# Patient Record
Sex: FEMALE | Race: WHITE | Employment: UNEMPLOYED | URBAN - METROPOLITAN AREA
[De-identification: names, ages, dates, MRNs, and addresses within clinical notes are randomized per-mention and may not be internally consistent; named-entity substitution may affect disease eponyms.]

---

## 2020-10-03 ENCOUNTER — HOSPITAL ENCOUNTER (OUTPATIENT)
Facility: CLINIC | Age: 75
Setting detail: OBSERVATION
Discharge: NURSING, REHAB OR LONG-TERM CARE | End: 2020-10-05
Attending: EMERGENCY MEDICINE | Admitting: EMERGENCY MEDICINE
Payer: COMMERCIAL

## 2020-10-03 ENCOUNTER — APPOINTMENT (OUTPATIENT)
Dept: GENERAL RADIOLOGY | Facility: CLINIC | Age: 75
End: 2020-10-03
Attending: EMERGENCY MEDICINE
Payer: COMMERCIAL
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
## 2020-10-03 DIAGNOSIS — N39.0 URINARY TRACT INFECTION WITHOUT HEMATURIA, SITE UNSPECIFIED: Primary | ICD-10-CM

## 2020-10-03 DIAGNOSIS — B37.2 CANDIDIASIS OF SKIN: ICD-10-CM

## 2020-10-03 DIAGNOSIS — S52.501A CLOSED FRACTURE OF DISTAL END OF RIGHT RADIUS, UNSPECIFIED FRACTURE MORPHOLOGY, INITIAL ENCOUNTER: ICD-10-CM

## 2020-10-03 DIAGNOSIS — E11.9 TYPE 2 DIABETES MELLITUS WITHOUT COMPLICATION, UNSPECIFIED WHETHER LONG TERM INSULIN USE (H): ICD-10-CM

## 2020-10-03 LAB
ALBUMIN SERPL-MCNC: 3.5 G/DL (ref 3.4–5)
ALBUMIN UR-MCNC: NEGATIVE MG/DL
ALP SERPL-CCNC: 143 U/L (ref 40–150)
ALT SERPL W P-5'-P-CCNC: 75 U/L (ref 0–50)
ANION GAP SERPL CALCULATED.3IONS-SCNC: 5 MMOL/L (ref 3–14)
APPEARANCE UR: CLEAR
AST SERPL W P-5'-P-CCNC: 73 U/L (ref 0–45)
BACTERIA #/AREA URNS HPF: ABNORMAL /HPF
BASOPHILS # BLD AUTO: 0 10E9/L (ref 0–0.2)
BASOPHILS NFR BLD AUTO: 0.3 %
BILIRUB SERPL-MCNC: 0.6 MG/DL (ref 0.2–1.3)
BILIRUB UR QL STRIP: NEGATIVE
BUN SERPL-MCNC: 10 MG/DL (ref 7–30)
CALCIUM SERPL-MCNC: 9 MG/DL (ref 8.5–10.1)
CHLORIDE SERPL-SCNC: 105 MMOL/L (ref 94–109)
CO2 SERPL-SCNC: 26 MMOL/L (ref 20–32)
COLOR UR AUTO: ABNORMAL
CREAT SERPL-MCNC: 0.56 MG/DL (ref 0.52–1.04)
DIFFERENTIAL METHOD BLD: ABNORMAL
EOSINOPHIL # BLD AUTO: 0 10E9/L (ref 0–0.7)
EOSINOPHIL NFR BLD AUTO: 0.1 %
ERYTHROCYTE [DISTWIDTH] IN BLOOD BY AUTOMATED COUNT: 12.2 % (ref 10–15)
GFR SERPL CREATININE-BSD FRML MDRD: >90 ML/MIN/{1.73_M2}
GLUCOSE SERPL-MCNC: 435 MG/DL (ref 70–99)
GLUCOSE UR STRIP-MCNC: >1000 MG/DL
HCT VFR BLD AUTO: 41.7 % (ref 35–47)
HGB BLD-MCNC: 14.3 G/DL (ref 11.7–15.7)
HGB UR QL STRIP: ABNORMAL
IMM GRANULOCYTES # BLD: 0 10E9/L (ref 0–0.4)
IMM GRANULOCYTES NFR BLD: 0.3 %
INR PPP: 1.13 (ref 0.86–1.14)
KETONES UR STRIP-MCNC: 10 MG/DL
LEUKOCYTE ESTERASE UR QL STRIP: NEGATIVE
LYMPHOCYTES # BLD AUTO: 2.3 10E9/L (ref 0.8–5.3)
LYMPHOCYTES NFR BLD AUTO: 18.4 %
MCH RBC QN AUTO: 31.8 PG (ref 26.5–33)
MCHC RBC AUTO-ENTMCNC: 34.3 G/DL (ref 31.5–36.5)
MCV RBC AUTO: 93 FL (ref 78–100)
MONOCYTES # BLD AUTO: 0.6 10E9/L (ref 0–1.3)
MONOCYTES NFR BLD AUTO: 4.7 %
MUCOUS THREADS #/AREA URNS LPF: PRESENT /LPF
NEUTROPHILS # BLD AUTO: 9.3 10E9/L (ref 1.6–8.3)
NEUTROPHILS NFR BLD AUTO: 76.2 %
NITRATE UR QL: POSITIVE
NRBC # BLD AUTO: 0 10*3/UL
NRBC BLD AUTO-RTO: 0 /100
PH UR STRIP: 5 PH (ref 5–7)
PLATELET # BLD AUTO: 222 10E9/L (ref 150–450)
POTASSIUM SERPL-SCNC: 3.8 MMOL/L (ref 3.4–5.3)
PROT SERPL-MCNC: 7.5 G/DL (ref 6.8–8.8)
RBC # BLD AUTO: 4.49 10E12/L (ref 3.8–5.2)
RBC #/AREA URNS AUTO: 0 /HPF (ref 0–2)
SARS-COV-2 RNA SPEC QL NAA+PROBE: NORMAL
SODIUM SERPL-SCNC: 136 MMOL/L (ref 133–144)
SOURCE: ABNORMAL
SP GR UR STRIP: 1.02 (ref 1–1.03)
SPECIMEN SOURCE: NORMAL
SQUAMOUS #/AREA URNS AUTO: <1 /HPF (ref 0–1)
UROBILINOGEN UR STRIP-MCNC: NORMAL MG/DL (ref 0–2)
WBC # BLD AUTO: 12.3 10E9/L (ref 4–11)
WBC #/AREA URNS AUTO: 1 /HPF (ref 0–5)

## 2020-10-03 PROCEDURE — 80053 COMPREHEN METABOLIC PANEL: CPT | Performed by: EMERGENCY MEDICINE

## 2020-10-03 PROCEDURE — 93005 ELECTROCARDIOGRAM TRACING: CPT

## 2020-10-03 PROCEDURE — 87088 URINE BACTERIA CULTURE: CPT | Performed by: EMERGENCY MEDICINE

## 2020-10-03 PROCEDURE — U0003 INFECTIOUS AGENT DETECTION BY NUCLEIC ACID (DNA OR RNA); SEVERE ACUTE RESPIRATORY SYNDROME CORONAVIRUS 2 (SARS-COV-2) (CORONAVIRUS DISEASE [COVID-19]), AMPLIFIED PROBE TECHNIQUE, MAKING USE OF HIGH THROUGHPUT TECHNOLOGIES AS DESCRIBED BY CMS-2020-01-R: HCPCS | Performed by: EMERGENCY MEDICINE

## 2020-10-03 PROCEDURE — 250N000013 HC RX MED GY IP 250 OP 250 PS 637: Performed by: STUDENT IN AN ORGANIZED HEALTH CARE EDUCATION/TRAINING PROGRAM

## 2020-10-03 PROCEDURE — 81001 URINALYSIS AUTO W/SCOPE: CPT | Performed by: EMERGENCY MEDICINE

## 2020-10-03 PROCEDURE — 87086 URINE CULTURE/COLONY COUNT: CPT | Performed by: EMERGENCY MEDICINE

## 2020-10-03 PROCEDURE — 96376 TX/PRO/DX INJ SAME DRUG ADON: CPT

## 2020-10-03 PROCEDURE — G0378 HOSPITAL OBSERVATION PER HR: HCPCS

## 2020-10-03 PROCEDURE — 999N000065 XR WRIST RIGHT 2 VIEW: Mod: RT

## 2020-10-03 PROCEDURE — C9803 HOPD COVID-19 SPEC COLLECT: HCPCS

## 2020-10-03 PROCEDURE — 250N000011 HC RX IP 250 OP 636: Performed by: EMERGENCY MEDICINE

## 2020-10-03 PROCEDURE — 85610 PROTHROMBIN TIME: CPT | Performed by: EMERGENCY MEDICINE

## 2020-10-03 PROCEDURE — 25605 CLTX DST RDL FX/EPHYS SEP W/: CPT | Mod: RT

## 2020-10-03 PROCEDURE — 99220 PR INITIAL OBSERVATION CARE,LEVEL III: CPT | Performed by: STUDENT IN AN ORGANIZED HEALTH CARE EDUCATION/TRAINING PROGRAM

## 2020-10-03 PROCEDURE — 99285 EMERGENCY DEPT VISIT HI MDM: CPT | Mod: 25

## 2020-10-03 PROCEDURE — 85025 COMPLETE CBC W/AUTO DIFF WBC: CPT | Performed by: EMERGENCY MEDICINE

## 2020-10-03 PROCEDURE — 96374 THER/PROPH/DIAG INJ IV PUSH: CPT

## 2020-10-03 PROCEDURE — 87186 SC STD MICRODIL/AGAR DIL: CPT | Performed by: EMERGENCY MEDICINE

## 2020-10-03 RX ORDER — CEFTRIAXONE 1 G/1
1 INJECTION, POWDER, FOR SOLUTION INTRAMUSCULAR; INTRAVENOUS ONCE
Status: COMPLETED | OUTPATIENT
Start: 2020-10-03 | End: 2020-10-03

## 2020-10-03 RX ORDER — ONDANSETRON 2 MG/ML
4 INJECTION INTRAMUSCULAR; INTRAVENOUS EVERY 6 HOURS PRN
Status: DISCONTINUED | OUTPATIENT
Start: 2020-10-03 | End: 2020-10-05 | Stop reason: HOSPADM

## 2020-10-03 RX ORDER — NALOXONE HYDROCHLORIDE 0.4 MG/ML
.1-.4 INJECTION, SOLUTION INTRAMUSCULAR; INTRAVENOUS; SUBCUTANEOUS
Status: DISCONTINUED | OUTPATIENT
Start: 2020-10-03 | End: 2020-10-05 | Stop reason: HOSPADM

## 2020-10-03 RX ORDER — OXYCODONE HYDROCHLORIDE 5 MG/1
5 TABLET ORAL EVERY 4 HOURS PRN
Status: DISCONTINUED | OUTPATIENT
Start: 2020-10-03 | End: 2020-10-05 | Stop reason: HOSPADM

## 2020-10-03 RX ORDER — ACETAMINOPHEN 325 MG/1
650 TABLET ORAL EVERY 4 HOURS PRN
Status: DISCONTINUED | OUTPATIENT
Start: 2020-10-03 | End: 2020-10-04

## 2020-10-03 RX ORDER — ONDANSETRON 4 MG/1
4 TABLET, ORALLY DISINTEGRATING ORAL EVERY 6 HOURS PRN
Status: DISCONTINUED | OUTPATIENT
Start: 2020-10-03 | End: 2020-10-05 | Stop reason: HOSPADM

## 2020-10-03 RX ORDER — LIDOCAINE 40 MG/G
CREAM TOPICAL
Status: DISCONTINUED | OUTPATIENT
Start: 2020-10-03 | End: 2020-10-05 | Stop reason: HOSPADM

## 2020-10-03 RX ORDER — CEFTRIAXONE 1 G/1
1 INJECTION, POWDER, FOR SOLUTION INTRAMUSCULAR; INTRAVENOUS EVERY 24 HOURS
Status: DISCONTINUED | OUTPATIENT
Start: 2020-10-04 | End: 2020-10-05 | Stop reason: HOSPADM

## 2020-10-03 RX ADMIN — OXYCODONE HYDROCHLORIDE 5 MG: 5 TABLET ORAL at 20:57

## 2020-10-03 RX ADMIN — CEFTRIAXONE SODIUM 1 G: 1 INJECTION, POWDER, FOR SOLUTION INTRAMUSCULAR; INTRAVENOUS at 18:50

## 2020-10-03 SDOH — HEALTH STABILITY: MENTAL HEALTH: HOW OFTEN DO YOU HAVE A DRINK CONTAINING ALCOHOL?: 2-4 TIMES A MONTH

## 2020-10-03 SDOH — HEALTH STABILITY: MENTAL HEALTH: HOW OFTEN DO YOU HAVE 6 OR MORE DRINKS ON ONE OCCASION?: NEVER

## 2020-10-03 SDOH — HEALTH STABILITY: MENTAL HEALTH: HOW MANY STANDARD DRINKS CONTAINING ALCOHOL DO YOU HAVE ON A TYPICAL DAY?: NOT ASKED

## 2020-10-03 ASSESSMENT — ACTIVITIES OF DAILY LIVING (ADL)
CONCENTRATING,_REMEMBERING_OR_MAKING_DECISIONS_DIFFICULTY: NO
FALL_HISTORY_WITHIN_LAST_SIX_MONTHS: YES
DRESSING/BATHING_DIFFICULTY: YES
TOILETING_ISSUES: YES
DIFFICULTY_EATING/SWALLOWING: NO
NUMBER_OF_TIMES_PATIENT_HAS_FALLEN_WITHIN_LAST_SIX_MONTHS: 2
WALKING_OR_CLIMBING_STAIRS_DIFFICULTY: YES
DIFFICULTY_COMMUNICATING: NO
WEAR_GLASSES_OR_BLIND: YES
WALKING_OR_CLIMBING_STAIRS: AMBULATION DIFFICULTY, REQUIRES EQUIPMENT
DOING_ERRANDS_INDEPENDENTLY_DIFFICULTY: NO
TOILETING_ASSISTANCE: TOILETING DIFFICULTY, ASSISTANCE 1 PERSON
VISION_MANAGEMENT: GLASSES
EQUIPMENT_CURRENTLY_USED_AT_HOME: CANE, QUAD
DRESSING/BATHING: BATHING DIFFICULTY, ASSISTANCE 1 PERSON;DRESSING DIFFICULTY, ASSISTANCE 1 PERSON

## 2020-10-03 ASSESSMENT — MIFFLIN-ST. JEOR: SCORE: 1313.28

## 2020-10-03 ASSESSMENT — ENCOUNTER SYMPTOMS: BACK PAIN: 1

## 2020-10-03 NOTE — PHARMACY-ADMISSION MEDICATION HISTORY
Pharmacy Medication History  Admission medication history interview status for the 10/3/2020  admission is complete. See EPIC admission navigator for prior to admission medications     Medication history sources: Patient  Medication history source reliability: Good  Adherence assessment: Good    Additional medication history information:    Aspirin: sometimes takes 4 tablets per day for pain    Medication reconciliation completed by provider prior to medication history? No    Time spent in this activity: 5 minutes       Prior to Admission medications    Medication Sig Last Dose Taking? Auth Provider   aspirin (ASA) 325 MG EC tablet Take 325 mg by mouth every 6 hours as needed for moderate pain 10/3/2020 at Unknown time Yes Unknown, Entered By History     Evelin Clifford, PharmD, BCCCP

## 2020-10-03 NOTE — ED PROVIDER NOTES
"  History     Chief Complaint:  Wrist Pain       The history is provided by the patient and a relative (brother-in-law).      Isabella Prather is a 75 year old female who presents with her brother in law for right wrist pain after a fall occurring this morning. The patient states that she was packing up her house to move out of town when she fell down eight steps in her house. She notes that she has since had some back pain as well during this time. She notes no hip pain, head injury or loss of consciousness. The patient states that she was seen at [facility] earlier today where wrist x-rays were obtained, noting a distal radius fracture. The patient was recommended to have \"a procedure\" completed at [facility], however, the patient states that she was concerned about this. She states that she would like to be admitted to the hospital \"so that something can be done over the weekend\" as she does not want to stay at home where her house is boxed up. The patient's brother-in-law notes that he is in town from [place] and then the patient is moving to [place], [place], where he feels that she can see orthopedics. He also notes that the patient normally walks with a cane and that he is concerned with her staying home alone and that her sister may not be able to take care of the patient appropriately in her condition. Her brother-in-law states that she has had difficulty taking care of herself at home and that the patient is moving in with her sister in order to handle some of these issues. The patient feels that she would be well enough to stay at her new [facility] home with her sister, however, her sisters do not feel that they will be able to appropriately be able to take care of her given this wrist injury and likely difficulty walking. Of note, the patient notes that she has incontinence issues and has been largely bed bound for the past year, however, states that she is unsure why.     Allergies:  No Known Drug " "Allergies    Medications:    The patient is not currently taking any prescribed medications.    Past Medical History:    History reviewed. No pertinent past medical history.    Past Surgical History:    Orbital fracture surgery    Family History:    History reviewed. No pertinent family history.    Social History:  The patient presents to the ED with her brother-in-law.  Smoking Status: Former Smoker, 20 years  Smokeless Tobacco: Never Used  Alcohol Use: Yes  PCP: No Ref-Primary, Physician     Review of Systems   Musculoskeletal: Positive for back pain.        (+) right wrist pain  (-) hip pain   Neurological:        (-) loss of consciousness    All other systems reviewed and are negative.    Physical Exam     Patient Vitals for the past 24 hrs:   BP Temp Temp src Pulse Resp SpO2 Height Weight   10/03/20 1536 128/79 97.1  F (36.2  C) Oral 71 16 95 % 1.702 m (5' 7\") 77.6 kg (171 lb)       Physical Exam    General: Sitting up in bed  Eyes:  The pupils are equal and round    Conjunctivae and sclerae are normal  ENT:    Wearing a mask, no head trauma  Neck:  Normal range of motion, no tenderness on cervical spine  CV:  Regular rate     Skin warm and well perfused   Resp:  Non labored breathing on room air    No tachypnea    No cough heard  GI:  Abdomen is soft, there is no rigidity    No distension    No rebound tenderness     No abdominal tenderness  MS:  No tenderness on thoracic/lumbar spine. Tender, swelling and ecchymosis on right distal forearm. Non tender right elbow, right shoulder  Skin:  No open areas on right wrist  Neuro:   Awake, alert.      Speech is normal and fluent.    Face is symmetric.     Moves all extremities equally    SILT on right hand  Psych: Normal affect.  Appropriate interactions.    Emergency Department Course     ECG:  Indication: Fall  Time: 1902  Vent. Rate 89 bpm. ID interval 144. QRS duration 62. QT/QTc 362/440. P-R-T axis 12 -16 3.   Sinus rhythm. Cannot rule out anterior infarct, age " undetermined. Abnormal ECG.  Read time: 1910     Imaging:  Radiology findings were communicated with the patient, family and admitting MD who voiced understanding of the findings.    XR Wrist Right G/E 3 Views:  Distal radius fracture again seen after closed reduction. Mild dorsal displacement is similar to the prior exam (6 mm) angulation of the distal component is improved. No new abnormality in the wrist. New splint in place. As per radiology.     Laboratory:  Laboratory findings were communicated with the patient, family and admitting MD who voiced understanding of the findings.    CBC: WBC: 12.3 (high) , HGB: 14.3, PLT: 222    CMP: Glucose 435 (high), ALT 75 (high), AST 73 (high) o/w WNL (Creatinine: 0.56)    UA with Microscopic: Glucose >1000, Ketones 10, Blood Trace, Nitrite Positive, Bacteria Few, Mucous Present o/w Negative    INR: 1.13    Urine Culture: Pending    Asymptomatic COVID-19 Virus (Coronavirus) PCR: Pending      [facility]    -Fracture    Date/Time: 10/3/2020 6:08 PM  Performed by: Jennie Zaldivar MD  Authorized by: Jennie Zaldivar MD       INJURY      Injury location:  Forearm    Forearm fracture type: distal radial      PRE PROCEDURE ASSESSMENT      Neurological function: normal      Range of motion: reduced      ANESTHESIA (see MAR for exact dosages)      Anesthesia method:  Local infiltration    Local anesthetic:  Lidocaine 1% w/o epi    PROCEDURE DETAILS:     Manipulation performed: yes      Skeletal traction used: yes      Reduction successful: yes      X-ray confirmed reduction: yes      Immobilization:  Splint    Splint type:  Sugar tong    POST PROCEDURE ASSESSMENT      Neurological function: normal      PROCEDURE   Patient Tolerance:  Patient tolerated the procedure well with no immediate complications        Splint Placement     Sugar tong splint was applied and after placement I checked and adjusted the fit to ensure proper positioning.  Patient was more comfortable with splint in place. Sensation and circulation are intact after splint placement.    Interventions:  1850 Rocephin 1g IV    Emergency Department Course:  Past medical records, nursing notes, and vitals reviewed.    1621 I performed an exam of the patient as documented above.     IV was inserted and blood was drawn for laboratory testing, results above.    The patient provided a urine sample here in the emergency department. This was sent for laboratory testing, findings above.    The patient was sent for a x-ray while in the emergency department, results above.     1653 I consulted with Dr. Hung, orthopedist, regarding the patient's history and presentation here in the emergency department.     1753 I rechecked the patient and discussed the results of her workup thus far. I performed the reduction and splint placement at this time, as noted above. At this point plan will be for admission, and the patient consents.     1820 I rechecked the patient who is feeling improved.     1852 I consulted with Dr. Cordon, hospitalist, regarding the patient's history and presentation here in the emergency department who accepted the patient for admission.    Findings and plan explained to the patient who consents to admission. Discussed the patient with Dr. Cordon, who will admit the patient to an observation bed for further monitoring, evaluation, and treatment.    I personally reviewed the imaging results with the patient and answered all related questions prior to admission.     Impression & Plan     Covid-19  Isabella Prather was evaluated during a global COVID-19 pandemic, which necessitated consideration that the patient might be at risk for infection with the SARS-CoV-2 virus that causes COVID-19.   Applicable protocols for evaluation were followed during the patient's care. COVID-19 was considered as part of the patient's evaluation. The plan for testing is: a test was obtained during this  visit.     Medical Decision Making:  Isabella Prather is a 75 year old female who presented to the ED with wrist fracture. Patient with fracture. Images able to be viewed in PACS. Reduction and splinting done. Main concern is inability to care for her self as she is currently moving and does not have a safe place to stay. Will require SW and PT evaluation. Brother and patient agreeable with hospital stay. No other injuries from fall. Sounds like mechanical fall. UA mildly abnormal, given ceftriaxone as she is having urinary symptoms.    Diagnosis:    ICD-10-CM    1. Closed fracture of distal end of right radius, unspecified fracture morphology, initial encounter  S52.501A CBC with platelets differential     UA with Microscopic     Comprehensive metabolic panel     INR     Urine Culture     Asymptomatic COVID-19 Virus (Coronavirus) by PCR     SARS-CoV-2 COVID-19 Virus (Coronavirus) RT-PCR     SARS-CoV-2 COVID-19 Virus (Coronavirus) RT-PCR   2. Fall, initial encounter  W19.XXXA        Disposition:  Admitted to Dr. Cordon.    Scribe Disclosure:  I, Isaiah Cortez, am serving as a scribe at 4:09 PM on 10/3/2020 to document services personally performed by Jennie Zaldivar MD based on my observations and the provider's statements to me.      Jennie Zaldivar MD  10/03/20 8537

## 2020-10-03 NOTE — ED NOTES
"[facility]  ED Nurse Handoff Report    ED Chief complaint: Wrist Pain      ED Diagnosis:   Final diagnoses:   Closed fracture of distal end of right radius, unspecified fracture morphology, initial encounter   Fall, initial encounter       Code Status:Per hospitalist.     Allergies: No Known Allergies    Patient Story: Pt fell down stairs this am, denies hitting head.  No LOC, does not have neck pain. [facility] sent pt over d/t a wrist fracture;  Pt would like to get admitted to the hospital because she is moving and does not have a place to stay tonight.  Pt states, [facility] was not able to do a direct admission.  Pt in splint from [facility].  Focused Assessment:  R wrist splint. Denies pain.     Treatments and/or interventions provided: IV abx. Reduction to R wrist/splint.   Patient's response to treatments and/or interventions: Tolerated well    To be done/followed up on inpatient unit:  Asymptomatic COVID swab    Does this patient have any cognitive concerns?: NA    Activity level - Baseline/Home:  Cane  Activity Level - Current:   Stand with Assist and Cane    Patient's Preferred language: English   Needed?: No    Isolation: None  Infection: Not Applicable  Bariatric?: No    Vital Signs:   Vitals:    10/03/20 1536   BP: 128/79   Pulse: 71   Resp: 16   Temp: 97.1  F (36.2  C)   TempSrc: Oral   SpO2: 95%   Weight: 77.6 kg (171 lb)   Height: 1.702 m (5' 7\")       Cardiac Rhythm:     Was the PSS-3 completed:   Yes  What interventions are required if any?               Family Comments: Sister, Brother in law  OBS brochure/video discussed/provided to patient/family: Yes              Name of person given brochure if not patient: No              Relationship to patient: No    For the majority of the shift this patient's behavior was Green.   Behavioral interventions performed were NA.    ED NURSE PHONE NUMBER: [phone]    Randi Ba RN, BSN  Emergency Department  [facility] - " [facility]

## 2020-10-03 NOTE — ED TRIAGE NOTES
Pt fell down stairs this am, denies hitting head.  No LOC, does not have neck pain. [facility] sent pt over d/t a wrist fracture;  Pt would like to get admitted to the hospital because she is moving and does not have a place to stay tonight.  Pt states, [facility] was not able to do a direct admission.  Pt in splint from [facility].

## 2020-10-04 ENCOUNTER — APPOINTMENT (OUTPATIENT)
Dept: OCCUPATIONAL THERAPY | Facility: CLINIC | Age: 75
End: 2020-10-04
Attending: STUDENT IN AN ORGANIZED HEALTH CARE EDUCATION/TRAINING PROGRAM
Payer: COMMERCIAL

## 2020-10-04 LAB
ANION GAP SERPL CALCULATED.3IONS-SCNC: 6 MMOL/L (ref 3–14)
BACTERIA SPEC CULT: ABNORMAL
BUN SERPL-MCNC: 10 MG/DL (ref 7–30)
CALCIUM SERPL-MCNC: 8.4 MG/DL (ref 8.5–10.1)
CHLORIDE SERPL-SCNC: 106 MMOL/L (ref 94–109)
CO2 SERPL-SCNC: 25 MMOL/L (ref 20–32)
CREAT SERPL-MCNC: 0.52 MG/DL (ref 0.52–1.04)
ERYTHROCYTE [DISTWIDTH] IN BLOOD BY AUTOMATED COUNT: 12.3 % (ref 10–15)
GFR SERPL CREATININE-BSD FRML MDRD: >90 ML/MIN/{1.73_M2}
GLUCOSE BLDC GLUCOMTR-MCNC: 293 MG/DL (ref 70–99)
GLUCOSE BLDC GLUCOMTR-MCNC: 309 MG/DL (ref 70–99)
GLUCOSE BLDC GLUCOMTR-MCNC: 312 MG/DL (ref 70–99)
GLUCOSE SERPL-MCNC: 291 MG/DL (ref 70–99)
HBA1C MFR BLD: 13.1 % (ref 0–5.6)
HCT VFR BLD AUTO: 35.9 % (ref 35–47)
HGB BLD-MCNC: 12.5 G/DL (ref 11.7–15.7)
INTERPRETATION ECG - MUSE: NORMAL
LABORATORY COMMENT REPORT: NORMAL
Lab: ABNORMAL
MCH RBC QN AUTO: 32.4 PG (ref 26.5–33)
MCHC RBC AUTO-ENTMCNC: 34.8 G/DL (ref 31.5–36.5)
MCV RBC AUTO: 93 FL (ref 78–100)
PLATELET # BLD AUTO: 191 10E9/L (ref 150–450)
POTASSIUM SERPL-SCNC: 3.7 MMOL/L (ref 3.4–5.3)
RBC # BLD AUTO: 3.86 10E12/L (ref 3.8–5.2)
SARS-COV-2 RNA SPEC QL NAA+PROBE: NEGATIVE
SODIUM SERPL-SCNC: 137 MMOL/L (ref 133–144)
SPECIMEN SOURCE: ABNORMAL
SPECIMEN SOURCE: NORMAL
WBC # BLD AUTO: 8.7 10E9/L (ref 4–11)

## 2020-10-04 PROCEDURE — 250N000013 HC RX MED GY IP 250 OP 250 PS 637: Performed by: PHYSICIAN ASSISTANT

## 2020-10-04 PROCEDURE — 80048 BASIC METABOLIC PNL TOTAL CA: CPT | Performed by: STUDENT IN AN ORGANIZED HEALTH CARE EDUCATION/TRAINING PROGRAM

## 2020-10-04 PROCEDURE — G0378 HOSPITAL OBSERVATION PER HR: HCPCS

## 2020-10-04 PROCEDURE — 250N000011 HC RX IP 250 OP 636: Performed by: STUDENT IN AN ORGANIZED HEALTH CARE EDUCATION/TRAINING PROGRAM

## 2020-10-04 PROCEDURE — 96376 TX/PRO/DX INJ SAME DRUG ADON: CPT

## 2020-10-04 PROCEDURE — 83036 HEMOGLOBIN GLYCOSYLATED A1C: CPT | Performed by: STUDENT IN AN ORGANIZED HEALTH CARE EDUCATION/TRAINING PROGRAM

## 2020-10-04 PROCEDURE — 250N000012 HC RX MED GY IP 250 OP 636 PS 637: Performed by: PHYSICIAN ASSISTANT

## 2020-10-04 PROCEDURE — 99226 PR SUBSEQUENT OBSERVATION CARE,LEVEL III: CPT | Performed by: PHYSICIAN ASSISTANT

## 2020-10-04 PROCEDURE — 85027 COMPLETE CBC AUTOMATED: CPT | Performed by: STUDENT IN AN ORGANIZED HEALTH CARE EDUCATION/TRAINING PROGRAM

## 2020-10-04 PROCEDURE — 97167 OT EVAL HIGH COMPLEX 60 MIN: CPT | Mod: GO

## 2020-10-04 PROCEDURE — 36415 COLL VENOUS BLD VENIPUNCTURE: CPT | Performed by: STUDENT IN AN ORGANIZED HEALTH CARE EDUCATION/TRAINING PROGRAM

## 2020-10-04 PROCEDURE — 97535 SELF CARE MNGMENT TRAINING: CPT | Mod: GO

## 2020-10-04 PROCEDURE — 999N001017 HC STATISTIC GLUCOSE BY METER IP

## 2020-10-04 PROCEDURE — 83036 HEMOGLOBIN GLYCOSYLATED A1C: CPT | Performed by: PHYSICIAN ASSISTANT

## 2020-10-04 PROCEDURE — 258N000003 HC RX IP 258 OP 636: Performed by: PHYSICIAN ASSISTANT

## 2020-10-04 PROCEDURE — 250N000013 HC RX MED GY IP 250 OP 250 PS 637: Performed by: STUDENT IN AN ORGANIZED HEALTH CARE EDUCATION/TRAINING PROGRAM

## 2020-10-04 PROCEDURE — 96372 THER/PROPH/DIAG INJ SC/IM: CPT | Performed by: PHYSICIAN ASSISTANT

## 2020-10-04 RX ORDER — NICOTINE POLACRILEX 4 MG
15-30 LOZENGE BUCCAL
Status: DISCONTINUED | OUTPATIENT
Start: 2020-10-04 | End: 2020-10-05 | Stop reason: HOSPADM

## 2020-10-04 RX ORDER — ACETAMINOPHEN 325 MG/1
975 TABLET ORAL 3 TIMES DAILY
Status: DISCONTINUED | OUTPATIENT
Start: 2020-10-04 | End: 2020-10-05 | Stop reason: HOSPADM

## 2020-10-04 RX ORDER — SODIUM CHLORIDE 9 MG/ML
INJECTION, SOLUTION INTRAVENOUS CONTINUOUS
Status: DISCONTINUED | OUTPATIENT
Start: 2020-10-04 | End: 2020-10-04

## 2020-10-04 RX ORDER — DEXTROSE MONOHYDRATE 25 G/50ML
25-50 INJECTION, SOLUTION INTRAVENOUS
Status: DISCONTINUED | OUTPATIENT
Start: 2020-10-04 | End: 2020-10-05 | Stop reason: HOSPADM

## 2020-10-04 RX ADMIN — OXYCODONE HYDROCHLORIDE 5 MG: 5 TABLET ORAL at 21:18

## 2020-10-04 RX ADMIN — INSULIN ASPART 4 UNITS: 100 INJECTION, SOLUTION INTRAVENOUS; SUBCUTANEOUS at 18:47

## 2020-10-04 RX ADMIN — MICONAZOLE NITRATE: 20 POWDER TOPICAL at 05:45

## 2020-10-04 RX ADMIN — SODIUM CHLORIDE: 9 INJECTION, SOLUTION INTRAVENOUS at 09:23

## 2020-10-04 RX ADMIN — METFORMIN HYDROCHLORIDE 500 MG: 500 TABLET, FILM COATED ORAL at 18:53

## 2020-10-04 RX ADMIN — CEFTRIAXONE SODIUM 1 G: 1 INJECTION, POWDER, FOR SOLUTION INTRAMUSCULAR; INTRAVENOUS at 18:53

## 2020-10-04 RX ADMIN — ACETAMINOPHEN 975 MG: 325 TABLET, FILM COATED ORAL at 10:44

## 2020-10-04 RX ADMIN — INSULIN ASPART 4 UNITS: 100 INJECTION, SOLUTION INTRAVENOUS; SUBCUTANEOUS at 12:53

## 2020-10-04 NOTE — CONSULTS
[facility]    Orthopedics Consultation    Date of Admission:  10/3/2020    Assessment & Plan   Isabella Prather is a 75 year old female who was admitted on 10/3/2020. I was asked to see the patient for   Right wrist pain.  She has a displaced right distal radius and minimally displaced ulna fractures.    -No surgical intervention at this time.  Based on her age and functional status this may never require surgery and will simply need to be switched out to a short arm cast in approximately 2 to 3 weeks.  -Follow-up with one of my hand or upper extremity partners, Dr. Jos Pires is currently on hand call  -Continue splint on right upper extremity  -Okay to discharge from an orthopedic standpoint once she has been evaluated by PT and can safely return home    Ulises Hung MD    Code Status    Full Code    Reason for Consult   Reason for consult: Right wrist pain    Primary Care Physician   Physician No Ref-Primary    History of Present Illness   Isabella Prather is a 75 year old female who presents with right wrist pain.  She was packing her house and fell down approximately 8 stairs.  She was initially seen at [facility] where a reduction maneuver was performed and she was placed in a sugar tong splint.  She came to [facility] in hopes of having surgery done this weekend.  She denies any numbness or tingling in her right upper extremity.  She is quite combative during my questioning of the patient.  It is very difficult to obtain information from this patient.    MEDS:   No current outpatient medications on file.       PAST MEDICAL HISTORY: History reviewed. No pertinent past medical history.    PAST SURGICAL HISTORY: History reviewed. No pertinent surgical history.    FAMILY HISTORY:   Family History   Problem Relation Age of Onset     No Known Problems Mother      No Known Problems Father        SOCIAL HISTORY:   Social History     Tobacco Use     Smoking status:  Former Smoker     Packs/day: 1.00     Years: 20.00     Pack years: 20.00     Smokeless tobacco: Never Used   Substance Use Topics     Alcohol use: Yes     Frequency: 2-4 times a month     Binge frequency: Never       ALLERGIES:  No Known Allergies    ROS:  10 point ROS neg other than the symptoms noted above in the HPI.      Physical Exam   Temp: 98.6  F (37  C) Temp src: Oral BP: 126/71 Pulse: 94   Resp: 16 SpO2: 93 % O2 Device: None (Room air)    Vital Signs with Ranges  Temp:  [97.1  F (36.2  C)-98.6  F (37  C)] 98.6  F (37  C)  Pulse:  [71-94] 94  Resp:  [16] 16  BP: (110-128)/(58-79) 126/71  SpO2:  [93 %-96 %] 93 %  171 lbs 0 oz    Constitutional: Pleasant, alert, appropriate, following commands.  HEENT: Head atraumatic normocephalic. Pupils equal round and reactive to light.  Respiratory: Unlabored breathing no audible wheeze  Cardiovascular: Regular rate and rhythm  GI: Abdomen soft nontender nondistended.  Lymph/Hematologic: No lymphadenopathy in areas examined  Genitourinary No barillas  Skin: No rashes, no cyanosis, no edema.  Musculoskeletal: Focused examination of the right upper extremity demonstrates a sugar tong splint in place.  She has normal sensation in radial median and ulnar nerve distributions.  She is able to flex and extend at the thumb IP as well as the index finger.  She is able to abduct and abduct the fingers.  She has good capillary refill.  She has no tenderness more proximally in her upper arm.  Neurologic: normal without focal findings, mental status, speech normal, alert and oriented x iii, RENATO, reflexes normal and symmetric  Neuropsychiatric: Very combative patient with labile emotions      Data   Results for orders placed or performed during the hospital encounter of 10/03/20 (from the past 24 hour(s))   -Fracture    Narrative    Jennie Zaldivar MD     10/3/2020 11:29 PM  [facility]    -Fracture    Date/Time: 10/3/2020 6:08 PM  Performed by: Kayley  Jennie Saldivar MD  Authorized by: Jennie Zaldivar MD       INJURY      Injury location:  Forearm    Forearm fracture type: distal radial      PRE PROCEDURE ASSESSMENT      Neurological function: normal      Range of motion: reduced      ANESTHESIA (see MAR for exact dosages)      Anesthesia method:  Local infiltration    Local anesthetic:  Lidocaine 1% w/o epi    PROCEDURE DETAILS:     Manipulation performed: yes      Skeletal traction used: yes      Reduction successful: yes      X-ray confirmed reduction: yes      Immobilization:  Splint    Splint type:  Sugar tong    POST PROCEDURE ASSESSMENT      Neurological function: normal      PROCEDURE   Patient Tolerance:  Patient tolerated the procedure well with no immediate   complications     CBC with platelets differential   Result Value Ref Range    WBC 12.3 (H) 4.0 - 11.0 10e9/L    RBC Count 4.49 3.8 - 5.2 10e12/L    Hemoglobin 14.3 11.7 - 15.7 g/dL    Hematocrit 41.7 35.0 - 47.0 %    MCV 93 78 - 100 fl    MCH 31.8 26.5 - 33.0 pg    MCHC 34.3 31.5 - 36.5 g/dL    RDW 12.2 10.0 - 15.0 %    Platelet Count 222 150 - 450 10e9/L    Diff Method Automated Method     % Neutrophils 76.2 %    % Lymphocytes 18.4 %    % Monocytes 4.7 %    % Eosinophils 0.1 %    % Basophils 0.3 %    % Immature Granulocytes 0.3 %    Nucleated RBCs 0 0 /100    Absolute Neutrophil 9.3 (H) 1.6 - 8.3 10e9/L    Absolute Lymphocytes 2.3 0.8 - 5.3 10e9/L    Absolute Monocytes 0.6 0.0 - 1.3 10e9/L    Absolute Eosinophils 0.0 0.0 - 0.7 10e9/L    Absolute Basophils 0.0 0.0 - 0.2 10e9/L    Abs Immature Granulocytes 0.0 0 - 0.4 10e9/L    Absolute Nucleated RBC 0.0    UA with Microscopic   Result Value Ref Range    Color Urine Light Yellow     Appearance Urine Clear     Glucose Urine >1000 (A) NEG^Negative mg/dL    Bilirubin Urine Negative NEG^Negative    Ketones Urine 10 (A) NEG^Negative mg/dL    Specific Gravity Urine 1.020 1.003 - 1.035    Blood Urine Trace (A) NEG^Negative    pH Urine 5.0 5.0 -  7.0 pH    Protein Albumin Urine Negative NEG^Negative mg/dL    Urobilinogen mg/dL Normal 0.0 - 2.0 mg/dL    Nitrite Urine Positive (A) NEG^Negative    Leukocyte Esterase Urine Negative NEG^Negative    Source Midstream Urine     WBC Urine 1 0 - 5 /HPF    RBC Urine 0 0 - 2 /HPF    Bacteria Urine Few (A) NEG^Negative /HPF    Squamous Epithelial /HPF Urine <1 0 - 1 /HPF    Mucous Urine Present (A) NEG^Negative /LPF   Comprehensive metabolic panel   Result Value Ref Range    Sodium 136 133 - 144 mmol/L    Potassium 3.8 3.4 - 5.3 mmol/L    Chloride 105 94 - 109 mmol/L    Carbon Dioxide 26 20 - 32 mmol/L    Anion Gap 5 3 - 14 mmol/L    Glucose 435 (H) 70 - 99 mg/dL    Urea Nitrogen 10 7 - 30 mg/dL    Creatinine 0.56 0.52 - 1.04 mg/dL    GFR Estimate >90 >60 mL/min/[1.73_m2]    GFR Estimate If Black >90 >60 mL/min/[1.73_m2]    Calcium 9.0 8.5 - 10.1 mg/dL    Bilirubin Total 0.6 0.2 - 1.3 mg/dL    Albumin 3.5 3.4 - 5.0 g/dL    Protein Total 7.5 6.8 - 8.8 g/dL    Alkaline Phosphatase 143 40 - 150 U/L    ALT 75 (H) 0 - 50 U/L    AST 73 (H) 0 - 45 U/L   INR   Result Value Ref Range    INR 1.13 0.86 - 1.14   Urine Culture    Specimen: Midstream Urine   Result Value Ref Range    Specimen Description Midstream Urine     Special Requests Specimen received in preservative     Culture Micro PENDING    XR Wrist Right 2 Views    Narrative    EXAM: XR WRIST RIGHT 2 VIEW  LOCATION: [facility]  DATE/TIME: 10/3/2020 6:36 PM    INDICATION: Postreduction evaluation.  COMPARISON: Wrist radiographs 10/3/2020 at 1343 hours.      Impression    IMPRESSION:   Distal radius fracture again seen after closed reduction. Mild dorsal displacement is similar to the prior exam (6 mm) angulation of the distal component is improved. No new abnormality in the wrist. New splint in place.   Asymptomatic COVID-19 Virus (Coronavirus) by PCR    Specimen: Nasopharyngeal   Result Value Ref Range    COVID-19 Virus PCR to [facility] - Source Nasopharyngeal      COVID-19 Virus PCR to [facility] - Result       Test received-See reflex to IDDL test SARS CoV2 (COVID-19) Virus RT-PCR   EKG 12-lead, tracing only   Result Value Ref Range    Interpretation ECG Click View Image link to view waveform and result    Basic metabolic panel   Result Value Ref Range    Sodium 137 133 - 144 mmol/L    Potassium 3.7 3.4 - 5.3 mmol/L    Chloride 106 94 - 109 mmol/L    Carbon Dioxide 25 20 - 32 mmol/L    Anion Gap 6 3 - 14 mmol/L    Glucose 291 (H) 70 - 99 mg/dL    Urea Nitrogen 10 7 - 30 mg/dL    Creatinine 0.52 0.52 - 1.04 mg/dL    GFR Estimate >90 >60 mL/min/[1.73_m2]    GFR Estimate If Black >90 >60 mL/min/[1.73_m2]    Calcium 8.4 (L) 8.5 - 10.1 mg/dL   CBC with platelets   Result Value Ref Range    WBC 8.7 4.0 - 11.0 10e9/L    RBC Count 3.86 3.8 - 5.2 10e12/L    Hemoglobin 12.5 11.7 - 15.7 g/dL    Hematocrit 35.9 35.0 - 47.0 %    MCV 93 78 - 100 fl    MCH 32.4 26.5 - 33.0 pg    MCHC 34.8 31.5 - 36.5 g/dL    RDW 12.3 10.0 - 15.0 %    Platelet Count 191 150 - 450 10e9/L   Hemoglobin A1c   Result Value Ref Range    Hemoglobin A1C 13.1 (H) 0 - 5.6 %

## 2020-10-04 NOTE — PROGRESS NOTES
[facility]    Medicine Progress Note - Hospitalist Service       Date of Admission:  10/3/2020  Assessment & Plan       Isabella Prather is a 75 year old female admitted on 10/3/2020. She presents with wrist pain following a fall.     Distal right radius fracture  Status-post mechanical fall  Initially presented to [facility] orthopedics for wrist pain following a fall. Treatment there remains unclear; pt ultimately presented to [facility] due to concerns of home safety with injury. Is s/p reduction and splinting in ED. Plain film with post-reduction films indicate mild dorsal displacement, similar to prior exam, splint in place.  - Pain control with scheduled tylenol, PRN oxycodone  - PT/OT/SW, anticipate pt would benefit from TCU  - Orthopedic consult, nonop management, follow-up with hand/upper extremity specialist in 1-2 weeks  - Splint, elevate    UTI, E.coli  Mild dysuria and UA with some bacteruria. Non septic appearing. No fever. Normal WBC. UCx positive for >100k E.coli, susceptibilities pending.  - Continue IV Rocephin while await sensitivity    Coccyx wound, POA  Unclear duration, patient indicates she sustained wound upon falling; sister concerned has been there longer. Conflicting reports of patient's mobility within the past year - some notes indicate patient stating she has largely been bedbound, others indicate she is ambulatory with cane. Patient currently stating she is ambulatory and does not have safety concerns at baseline. On exam, mepilex in place, ecchymosis present to superior gluteal folds R > L.  - WOC consult to evaluate    Diabetes Mellitus Type II, new diagnosis  Admission BG value of 435, UA with >1000 glucose present. Hgb A1c 13.1%, confirming diagnosis of diabetes. Had extensive discussion with patient and sister present at bedside indication to start treatment with insulin.   - Start Lantus 10u daily, will likely need to uptitrate for improved control  -  Metformin 500mg daily started  - Medium ssi  - Accuchecks TID AC & at bedtime  - Mod CHO diet  - Will need to establish care with a PCP for further management, monitoring; discussed with RN Care coordinator and patient  - Pharm liason consult for insulin coverage as pt is concerned re:cost  - May benefit from DM educator referral at discharge       Diet: Moderate Consistent CHO Diet    DVT Prophylaxis: Low Risk/Ambulatory with no VTE prophylaxis indicated  Reyez Catheter: not present  Code Status: Full Code           Disposition Plan   Expected discharge: Tomorrow, recommended to transitional care unit once safe disposition plan/ TCU bed available.  Entered: Colt Florentino PA-C 10/04/2020, 10:58 AM       The patient's care was discussed with the Attending Physician, Dr. Melendez, Bedside Nurse, Care Coordinator/, Patient and Patient's Family. I personally spent 40 minutes directly at patient bedside discussing plan of care, providing education, and in coordination of care.    Colt Florentino PA-C  Hospitalist Service  [facility]  Contact information available via [facility] Paging/Directory    ______________________________________________________________________    Interval History   Pt seen & evaluated. Numerous questions regarding management of fracture, further treatment, pain management, discharge planning, and new medications. Frustrated has not received pain medications since last night, indicates wrist and hand are highly painful. 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 Sister at bedside, both inquiring regarding TCU and insurance, etc. For discharge planning. Would like to speak to .     Discussed new diagnosis of diabetes and indication for insulin, pt agreeable at this time. Hopeful to not require insulin for long-term, understands will likely need at discharge.    Data reviewed today: I reviewed all medications, new labs  and imaging results over the last 24 hours. I personally reviewed no images or EKG's today.    Physical Exam   Vital Signs: Temp: 98.6  F (37  C) Temp src: Oral BP: 126/71 Pulse: 94   Resp: 16 SpO2: 93 % O2 Device: None (Room air)    Weight: 171 lbs 0 oz  GEN: well-developed, well-nourished, appears comfortable  HEENT: NCAT, EOM intact bilaterally, sclera clear, conjunctiva normal, nose & mouth patent, mucous membranes moist  CHEST: lungs CTA bilaterally, no increased work of breathing, no wheeze, crackles, rhonchi  HEART: RRR, S1 & S2, +systolic murmur 2/6 heard left anterolateral chest  ABD: soft, nontender, nondistended, no guarding or rigidity, +BS in all 4 quadrants  MSK: RUE in short arm splint, wiggles digits, sensation intact to light touch overlying lateral edge of 5th digit, dorsal/volar aspects of digits 2-4, and dorsal webspace of thumb; AROM bilateral UE/LE, pedal & radial pulses 2+ bilaterally  NEURO: awake, alert. Slightly tangential in conversation, difficulty staying on task. CN II-XII grossly intact. Sensation grossly intact to light touch.   SKIN: warm & dry without rash, no pedal edema    Data   Recent Labs   Lab 10/04/20  0704 10/03/20  1722   WBC 8.7 12.3*   HGB 12.5 14.3   MCV 93 93    222   INR  --  1.13    136   POTASSIUM 3.7 3.8   CHLORIDE 106 105   CO2 25 26   BUN 10 10   CR 0.52 0.56   ANIONGAP 6 5   MENDEZ 8.4* 9.0   * 435*   ALBUMIN  --  3.5   PROTTOTAL  --  7.5   BILITOTAL  --  0.6   ALKPHOS  --  143   ALT  --  75*   AST  --  73*     Recent Results (from the past 24 hour(s))   XR Wrist Right 2 Views    Narrative    EXAM: XR WRIST RIGHT 2 VIEW  LOCATION: [facility]  DATE/TIME: 10/3/2020 6:36 PM    INDICATION: Postreduction evaluation.  COMPARISON: Wrist radiographs 10/3/2020 at 1343 hours.      Impression    IMPRESSION:   Distal radius fracture again seen after closed reduction. Mild dorsal displacement is similar to the prior exam (6 mm) angulation of the  distal component is improved. No new abnormality in the wrist. New splint in place.     Medications     sodium chloride 100 mL/hr at 10/04/20 0923       acetaminophen  975 mg Oral TID     cefTRIAXone  1 g Intravenous Q24H     insulin aspart  1-7 Units Subcutaneous TID AC     insulin aspart  1-5 Units Subcutaneous At Bedtime     insulin glargine  10 Units Subcutaneous QAM AC     metFORMIN  500 mg Oral Daily with supper     sodium chloride (PF)  3 mL Intracatheter Q8H

## 2020-10-04 NOTE — PROGRESS NOTES
RECEIVING UNIT ED HANDOFF REVIEW    ED Nurse Handoff Report was reviewed by: Shandra Haney RN on October 3, 2020 at 7:52 PM

## 2020-10-04 NOTE — PROGRESS NOTES
[facility]      OUTPATIENT OCCUPATIONAL THERAPY  EVALUATION  PLAN OF TREATMENT FOR OUTPATIENT REHABILITATION  (COMPLETE FOR INITIAL CLAIMS ONLY)  Patient's Last Name, First Name, M.I.  YOB: 1945  Isabella Prather                          Provider's Name  [facility] Medical Record No.  [id number]                               Onset Date:  10/03/20   Start of Care Date:  10/04/20     Type:     ___PT   _X_OT   ___SLP Medical Diagnosis:  Distal radius and ulna fractures                         OT Diagnosis:  Decreased safety with performance of ADL/IADL    Visits from SOC:  1   _________________________________________________________________________________  Plan of Treatment/Functional Goals    Planned Interventions: ADL retraining, balance training, bed mobility training, cognition, strengthening, transfer training, progressive activity/exercise   Goals: See Occupational Therapy Goals on Care Plan in [employer] electronic health record.    Therapy Frequency: Daily  Predicted Duration of Therapy Intervention: 1 week (will need TCU stay for further rehab)   _________________________________________________________________________________    I CERTIFY THE NEED FOR THESE SERVICES FURNISHED UNDER        THIS PLAN OF TREATMENT AND WHILE UNDER MY CARE     (Physician co-signature of this document indicates review and certification of the therapy plan).                Certification date from: 10/04/20, Certification date to: 10/04/20    Referring Physician: Feliberto Cordon MD            Initial Assessment        See Occupational Therapy evaluation dated 10/04/20 in [employer] electronic health record.

## 2020-10-04 NOTE — PLAN OF CARE
PT: Orders received. Chart reviewed and discussed with care team.  PT not indicated in acute setting due to patient requiring TCU stay at discharge per OT eval, PT evaluation deferred to next level of care.  Defer discharge recommendations to OT.  Will complete orders.

## 2020-10-04 NOTE — H&P
[facility]    History and Physical - Hospitalist Service       Date of Admission:  10/3/2020    Assessment & Plan   Isabella Prather is a 75 year old female admitted on 10/3/2020. She presents with wrist pain following a fall.       Closed fracture of distal end of right radius, unspecified fracture morphology, initial encounter    Fall, initial encounter    Assessment: presents with wrist pain following mechanical fall. Wrist XR shows Distal radius fracture again seen after closed reduction. Mild dorsal displacement is similar to the prior exam (6 mm) angulation of the distal component is improved. No new abnormality in the wrist. There is a splint in place    Plan:   - admit to obs  - Pain control as needed  - PT/OT/SW  - Fall precautions  - Orthopedic surgery eval.     UTI  Assessment: mild dysuria and UA with some bacteruria. Possible early UTI? Non septic appearing. No fever. Normal WBC  Plan:  - Follow UC  - IV Ceftriaxone for now.        Diet:   Regular   DVT Prophylaxis: Low Risk/Ambulatory with no VTE prophylaxis indicated  Reyez Catheter: not present  Code Status:   FULL CODE         Disposition Plan   Expected discharge: Tomorrow, recommended to transitional care unit once adequate pain management/ tolerating PO medications and safe disposition plan/ TCU bed available.  Entered: Feliberto Cordon MD 10/03/2020, 8:06 PM     The patient's care was discussed with the Patient and ED Provider.    Feliberto Cordon MD  [facility]  Contact information available via [facility] Paging/Directory      ______________________________________________________________________    Chief Complaint     Wrist Pain    History is obtained from the patient    History of Present Illness      Isabella Prather is a 75 year old female with no significant past medical history who presents for evaluation of wrist pain.    Patient reports that she was in the process of packing up her house to move  out of town when she sustained a mechanical fall on the morning of admission.  She denies any loss of consciousness, she denies any chest pain or shortness of breath prior to fall.  She was evaluated at [facility] earlier today and a wrist x-ray showed a distal radius fracture.  Patient ultimately presented to the emergency department as she was worried that her wrist injury was severe and she was not feeling safe to be at home.  She is in the process of moving to [facility].  She otherwise denies any fevers or chills, no recent blood in her stool, no weight loss or night sweats.  She has had some mild dysuria.  But otherwise no urgency or frequency and no hematuria.  She has been dealing with incontinence issues over the past year.  She reports that she is minimally ambulatory, mostly bedbound.  She endorse overall low motivations to be active.  Patient otherwise has no other complaints this time    Review of Systems      The 10 point Review of Systems is negative other than noted in the HPI or here.     Past Medical History      I have reviewed this patient's medical history and updated it with pertinent information if needed.   History reviewed. No pertinent past medical history.    Past Surgical History   I have reviewed this patient's surgical history and updated it with pertinent information if needed.  History reviewed. No pertinent surgical history.    Social History   I have reviewed this patient's social history and updated it with pertinent information if needed.  Social History     Tobacco Use     Smoking status: Former Smoker     Packs/day: 1.00     Years: 20.00     Pack years: 20.00     Smokeless tobacco: Never Used   Substance Use Topics     Alcohol use: Yes     Frequency: 2-4 times a month     Binge frequency: Never     Drug use: Never       Family History   I have reviewed this patient's family history and updated it with pertinent information if needed.  Family History   Problem Relation Age of  Onset     No Known Problems Mother      No Known Problems Father      Prior to Admission Medications   Prior to Admission Medications   Prescriptions Last Dose Informant Patient Reported? Taking?   aspirin (ASA) 325 MG EC tablet 10/3/2020 at Unknown time  Yes Yes   Sig: Take 325 mg by mouth every 6 hours as needed for moderate pain      Facility-Administered Medications: None     Allergies   No Known Allergies    Physical Exam   Vital Signs: Temp: 97.1  F (36.2  C) Temp src: Oral BP: 128/79 Pulse: 71   Resp: 16 SpO2: 95 % O2 Device: None (Room air)    Weight: 171 lbs 0 oz    Constitutional: awake, alert, cooperative, no apparent distress.   Eyes: Lids and lashes normal, pupils equal, round and reactive to light   ENT: Normocephalic, without obvious abnormality, atraumatic, sinuses nontender on palpation   Hematologic / Lymphatic: no cervical lymphadenopathy   Respiratory: CTABL   Cardiovascular: RRR with no m/r/g   GI: Normal bowel sounds, soft, non-distended, non-tender.   Skin: normal skin color, texture, turgor   Musculoskeletal: There is no redness, warmth, or swelling of the joints. Full range of motion noted.    Neurologic: Awake, alert, oriented to name, place and time. Cranial nerves II-XII are grossly intact. Motor is 5 out of 5 bilaterally. Sensory is intact.   Neuropsychiatric: normal mood and affect      Data   Data reviewed today: I reviewed all medications, new labs and imaging results over the last 24 hours. I personally reviewed the Wrist XR  image(s) showing see below.    Wrist XR  Distal radius fracture again seen after closed reduction. Mild dorsal displacement is similar to the prior exam (6 mm) angulation of the distal component is improved. No new abnormality in the wrist. New splint in place    Most Recent 3 CBC's:  Recent Labs   Lab Test 10/03/20  1722   WBC 12.3*   HGB 14.3   MCV 93        Most Recent 3 BMP's:  Recent Labs   Lab Test 10/03/20  1722      POTASSIUM 3.8   CHLORIDE  105   CO2 26   BUN 10   CR 0.56   ANIONGAP 5   MENDEZ 9.0   *     Recent Results (from the past 24 hour(s))   XR Wrist Right 2 Views    Narrative    EXAM: XR WRIST RIGHT 2 VIEW  LOCATION: [facility]  DATE/TIME: 10/3/2020 6:36 PM    INDICATION: Postreduction evaluation.  COMPARISON: Wrist radiographs 10/3/2020 at 1343 hours.      Impression    IMPRESSION:   Distal radius fracture again seen after closed reduction. Mild dorsal displacement is similar to the prior exam (6 mm) angulation of the distal component is improved. No new abnormality in the wrist. New splint in place.

## 2020-10-04 NOTE — CONSULTS
Care Management Initial Consult    General Information:  Patient's communication style: spoken language (English or Bilingual)  Hearing Difficulty or Deaf: no Wear Glasses or Blind: yes  Cognitive/Neuro/Behavioral: WDL                 Advance Care Planning: Advance Care Planning Reviewed: no concerns identified       Living Environment:   People in home: alone  Current living Arrangements: house          Family/Social Support:  Care provided by: self  Provides care for: no one  Description of Support System: Supportive, Involved  Description of Support System: Supportive, Involved       Lifestyle & Psychosocial Needs:        Socioeconomic History     Marital status: Single     Spouse name: Not on file     Number of children: Not on file     Years of education: Not on file     Highest education level: Not on file     Tobacco Use     Smoking status: Former Smoker     Packs/day: 1.00     Years: 20.00     Pack years: 20.00     Smokeless tobacco: Never Used   Substance and Sexual Activity     Alcohol use: Yes     Frequency: 2-4 times a month     Binge frequency: Never     Drug use: Never       Functional Status:  Prior to admission patient needed assistance: (N/A)        Current Resources:   Skilled Home Care Services: (N/A)  Community Resources: (N/A)  Equipment currently used at home: (N/A)  Supplies currently used at home: (N/A)    Employment:  Employment Status: retired     Financial/Environmental Concerns:         Values/Beliefs:  Spiritual, Cultural Beliefs, Protestant Practices, Values that affect care: (N/A)               Additional Information:  Per social work consult for discharge planning and TCU placement on discharge.  Patient was admitted on 10-3-20 after a fall resulting in a distal radius fracture.  The tentative date of discharge is 10-5-20.  Patient is admitted under observation status.  Reviewed chart and spoke with patient and her sister Cesilia regarding discharge plans. Cesilia also called their  brother-in-law Miguel ( to another sister), [phone], and asked him to join in on the conversation as he was a nursing home administrator for 40 years.   Reviewed the therapy discharge recommendations of TCU placement on discharge and they are all in agreement.  Explained that since she is in observation status typically with patient's insurance if there is a skilled need they will waive the inpatient 3 day stay and therapy is recommending TCU on discharge. Patient and family state they understand.  Patient is preferring a private room.   Explained that depending on the facility the amenity fee will be anywhere from $30-$45.  Patient and family is in agreement.  Patient and family is asking for referrals to be sent to [place], [facility], and [facility].  Referrals sent, via discharge on the double, to check bed availability.        BETI Valles, LICSW  Lead   [phone]  [facility]

## 2020-10-04 NOTE — PROGRESS NOTES
Pt VSS. Pt seems to have memory deficit, sister confirmed this. Pt is particular about care. Irritable sometimes. Pain on R wrist managed with scheduled tylenol, and ice. Non surgical. Diet resumed. Started on insulin Hgb A1c was 13.1. Up with SBA to BR. Prefers underwear that sister brought for her. Bruises noted on right lower back. Red groin managed with prescribed powder. Will continue to monitor.

## 2020-10-04 NOTE — PROGRESS NOTES
Care Management Follow Up Note    Length of Stay (days) 0    Patient plan of care discussed at Interdisciplinary Rounds: yes  Expected Discharge Date: 10/05/20(TCU)  Concerns to be Addressed:      Patient has not seen a PCP for over 5 years and has a new diagnosis of diabetes.  Plan is for patient to move in with her sister in [facility]. Per her brother in [facility], family will have patient set up with new PCP and diabetic education in [place] by the time patient is ready to discharge from TCU.      Anticipated Discharge Disposition:  To TCU 10/5/2020    Jazz Bahkta RN  Care Coordinator  [facility]  [phone]

## 2020-10-04 NOTE — PROGRESS NOTES
10/04/20 1100   Quick Adds   Type of Visit Initial Occupational Therapy Evaluation       Present no   Living Environment   People in home alone   Current Living Arrangements house   Home Accessibility stairs to enter home;stairs within home   Transportation Anticipated family or friend will provide   Living Environment Comments Pt is in the process of moving out of her 2SH and per her sister has a closing date set for next week. Pt unclear on total amount of stairs in home, but fell down approx 8 steps while attempting to move boxes down the steps.    Self-Care   Usual Activity Tolerance poor   Current Activity Tolerance poor   Regular Exercise No   Equipment Currently Used at Home cane, quad   Activity/Exercise/Self-Care Comment Pt reports she has been using quad cane with her R hand. Now is using L hand due to R radius/ulna fracture. Per chart review and pt report, pt has been bed bound for much of last year although is unclear why. She reports ongoing incontinence issues. Pt is right hand dominant.    Disability/Function   Wear Glasses or Blind yes   Vision Management Pt states she has glasses, but needs cataract surgery. She reports vision as a major problem to her ability to safely mobilize in her home.    Change in Functional Status Since Onset of Current Illness/Injury yes   General Information   Onset of Illness/Injury or Date of Surgery 10/03/20   Referring Physician Feliberto Cordon MD   Patient/Family Therapy Goal Statement (OT) To go to TCU for further rehab, then to eventually move to [facility] with her sister.    Additional Occupational Profile Info/Pertinent History of Current Problem High   Performance Patterns (Routines, Roles, Habits) Pt has had difficulty taking care of herself at home and is not safe to perform ADL/IADL or functional mobility without assist at this time.    Existing Precautions/Restrictions fall   Limitations/Impairments other (see comments)  (Pt unable to  use R hand (dominant) in ADL/IADL)   General Observations and Info Pt unsteady with ambulation, requires CGA/min A, leans against sink during g/h tasks d/t fatigue   Cognitive Status Examination   Orientation Status orientation to person, place and time   Affect/Mental Status (Cognitive) anxious   Follows Commands follows one-step commands   Safety Deficit insight into deficits/self-awareness   Cognitive Status Comments Pt frequently asking questions unrelated to OT eval questions, including about insurance. OT provided education and reassurance that social work would address insurance concerns;  present in room upon completion of OT eval.    Visual Perception   Visual Impairment/Limitations visual/perceptual impairments present   Visual Perception Impairment other (see comments)   Impact of Vision Impairment on Function (Vision) Pt reports that she has cataracts which greatly impact her ability to see clearly during mobility in her home.    Sensory   Sensory Quick Adds No deficits were identified   Pain Assessment   Patient Currently in Pain   (Pt reports some R wrist pain, LBP due to bruising from fall )   Range of Motion Comprehensive   General Range of Motion other (see comments)   Comment, General Range of Motion ROM impaired in R wrist due to fracture/casting   Strength Comprehensive (MMT)   Comment, General Manual Muscle Testing (MMT) Assessment Pt demo decreased strength, grossly 4/5 throughout BUE (R elbow/wrist/digits not tested)    Muscle Tone Assessment   Muscle Tone Quick Adds No deficits were identified   Coordination   Upper Extremity Coordination Right UE impaired   Coordination Comments d/t casting   Bed Mobility   Assistive Device (Bed Mobility) bed rails   Comment (Bed Mobility) Pt required mod assist to move from supine to sit EOB with verbal cues for sequencing.    Transfers   Transfer Comments Pt min Ax1 with GB and walker as well as assist for IV pole.    Balance   Balance  Comments Pt needed to lean on sink while standing to perform g/h tasks due to impaired balance. OT provided CGA.    Activities of Daily Living   BADL Assessment/Intervention bathing;upper body dressing;lower body dressing;grooming;toileting   Bathing Assessment/Intervention   [place] Level (Bathing) maximum assist (25% patient effort)   Upper Body Dressing Assessment/Training   [place] Level (Upper Body Dressing) maximum assist (25% patient effort)   Comment (Upper Body Dressing) Unable to use R hand   Lower Body Dressing Assessment/Training   [place] Level (Lower Body Dressing) maximum assist (25% patient effort)   Comment (Lower Body Dressing) Unable to use R hand   Grooming Assessment/Training   [place] Level (Grooming) minimum assist (75% patient effort)   Comment (Grooming) Min A to apply toothpaste to toothbrush    Toileting   [place]lin Level (Toileting) minimum assist (75% patient effort)   Comment (Toileting) Min A with clothing management   Instrumental Activities of Daily Living (IADL)   IADL Comments Per pt report, she lived alone and performed most IADL independently. However, pt states she has difficulty moving around her house and will be moving to a one story home with her sister. Her sister will be able to help her with IADL including driving, meal prep, med management but is unable to provide physical assistance for mobility needs.    Clinical Impression   Criteria for Skilled Therapeutic Interventions Met (OT) yes   OT Diagnosis Decreased safety with performance of ADL/IADL    OT Problem List-Impairments impacting ADL activity tolerance impaired;balance;cognition;mobility;strength;vision   ADL comments/analysis Pt requires min to max assist with all ADL/IADL at this time   Assessment of Occupational Performance 3-5 Performance Deficits   Identified Performance Deficits Activity tolerance/endurance, fall risk, cognition, vision, RUE impaired, balance impaired   Planned Therapy  "Interventions (OT) ADL retraining;balance training;bed mobility training;cognition;strengthening;transfer training;progressive activity/exercise   Clinical Decision Making Complexity (OT) high complexity   Therapy Frequency (OT) Daily   Predicted Duration of Therapy 1 week (will need TCU stay for further rehab)    Anticipated Equipment Needs Upon Discharge (OT) shower chair;cane, quad;cane, straight;dressing equipment;raised toilet seat   Risks and Benefits of Treatment have been explained. Yes   Patient, Family & other staff in agreement with plan of care Yes   Comment-Clinical Impression Pt is unable to safely complete ADL/IADL independently at this time   OT Discharge Planning    OT Discharge Recommendation (DC Rec) Transitional Care Facility   OT Rationale for DC Rec Pt is unable to safely complete ADL/IADL or functional mobility independently and requires min to max assist for all tasks. Pt would benefit from TCU for further rehab to increase strength and endurance in order to increase safety and independence with ADL/IADL performance prior to moving to a new house with her sister.    Therapy Certification   Start of Care Date 10/04/20   Certification date from 10/04/20   Certification date to 10/04/20   Medical Diagnosis Distal radius and ulna fractures    [facility] AM-PAC  \"6 Clicks\" Daily Activity Inpatient Short Form   1. Putting on and taking off regular lower body clothing? 1 - Total   2. Bathing (including washing, rinsing, drying)? 1 - Total   3. Toileting, which includes using toilet, bedpan or urinal? 2 - A Lot   4. Putting on and taking off regular upper body clothing? 2 - A Lot   5. Taking care of personal grooming such as brushing teeth? 3 - A Little   6. Eating meals? 3 - A Little   Daily Activity Raw Score (Score out of 24.Lower scores equate to lower levels of function) 12   Total Evaluation Time (Minutes)   Total Evaluation Time (Minutes) 15     "

## 2020-10-04 NOTE — PROGRESS NOTES
"Pt VS stable on RA, A/O x4 but can be forgetful. 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 Regular diet, SBA. Pt prefers personal briefs that her sister brought. Groin is red, managed with good tigre care and powder. Plans to discharge to TCU, continue patient/family education on managing diabetes as pt is recently diagnosed Type 2 diabetic. Continue to monitor.     "

## 2020-10-04 NOTE — PLAN OF CARE
-diagnostic tests and consults completed and resulted: NOT MET, ortho consult today  -vital signs normal or at patient baseline: MET   -tolerating oral intake to maintain hydration: MET   -returns to baseline functional status: NOT MET  -safe disposition plan has been identified  Pt A/Ox4. VSS on ra. CMS intact, able to wiggle fingers on R hand. Up w SBA + cane to BR. Angeline area very red and itchy, miconazole powder applied. Open area on coccyx, mepilex applied and WOC consult ordered. Pt to be evaluated by ortho surgery today. Has been NPO since midnight. Will continue to monitor.

## 2020-10-04 NOTE — PROGRESS NOTES
Confirmed with pt's sister to make sure it was ok for pt's brother (Miguel MARTIN) to take pt's envelope from security.

## 2020-10-04 NOTE — PROVIDER NOTIFICATION
Pgd PA about plans of care for pt. H a1c 13.1, maintain NPO?    Insulin ordered. Agreed that pt should remain NPO until ortho eval. Will come and talk to pt.

## 2020-10-05 VITALS
HEIGHT: 67 IN | RESPIRATION RATE: 16 BRPM | DIASTOLIC BLOOD PRESSURE: 59 MMHG | TEMPERATURE: 98 F | HEART RATE: 69 BPM | WEIGHT: 171 LBS | OXYGEN SATURATION: 98 % | SYSTOLIC BLOOD PRESSURE: 128 MMHG | BODY MASS INDEX: 26.84 KG/M2

## 2020-10-05 LAB
GLUCOSE BLDC GLUCOMTR-MCNC: 222 MG/DL (ref 70–99)
GLUCOSE BLDC GLUCOMTR-MCNC: 243 MG/DL (ref 70–99)
GLUCOSE BLDC GLUCOMTR-MCNC: 314 MG/DL (ref 70–99)

## 2020-10-05 PROCEDURE — 250N000012 HC RX MED GY IP 250 OP 636 PS 637: Performed by: PHYSICIAN ASSISTANT

## 2020-10-05 PROCEDURE — G0463 HOSPITAL OUTPT CLINIC VISIT: HCPCS | Mod: 25,27

## 2020-10-05 PROCEDURE — G0378 HOSPITAL OBSERVATION PER HR: HCPCS

## 2020-10-05 PROCEDURE — 999N001017 HC STATISTIC GLUCOSE BY METER IP

## 2020-10-05 PROCEDURE — 96372 THER/PROPH/DIAG INJ SC/IM: CPT | Performed by: PHYSICIAN ASSISTANT

## 2020-10-05 PROCEDURE — 250N000013 HC RX MED GY IP 250 OP 250 PS 637: Performed by: STUDENT IN AN ORGANIZED HEALTH CARE EDUCATION/TRAINING PROGRAM

## 2020-10-05 PROCEDURE — 99217 PR OBSERVATION CARE DISCHARGE: CPT | Performed by: PHYSICIAN ASSISTANT

## 2020-10-05 RX ORDER — GLUCOSAMINE HCL/CHONDROITIN SU 500-400 MG
CAPSULE ORAL
Qty: 100 EACH | Refills: 3 | DISCHARGE
Start: 2020-10-05

## 2020-10-05 RX ORDER — CEFPODOXIME PROXETIL 200 MG/1
200 TABLET, FILM COATED ORAL 2 TIMES DAILY
Qty: 10 TABLET | Refills: 0 | DISCHARGE
Start: 2020-10-05 | End: 2020-10-10

## 2020-10-05 RX ORDER — OXYCODONE HYDROCHLORIDE 5 MG/1
5 TABLET ORAL EVERY 4 HOURS PRN
Qty: 10 TABLET | Refills: 0 | Status: SHIPPED | OUTPATIENT
Start: 2020-10-05 | End: 2020-10-15

## 2020-10-05 RX ORDER — LANCETS
EACH MISCELLANEOUS
DISCHARGE
Start: 2020-10-05

## 2020-10-05 RX ORDER — ACETAMINOPHEN 325 MG/1
975 TABLET ORAL 3 TIMES DAILY
DISCHARGE
Start: 2020-10-05 | End: 2020-10-06

## 2020-10-05 RX ADMIN — OXYCODONE HYDROCHLORIDE 5 MG: 5 TABLET ORAL at 08:37

## 2020-10-05 RX ADMIN — INSULIN GLARGINE 10 UNITS: 100 INJECTION, SOLUTION SUBCUTANEOUS at 08:32

## 2020-10-05 RX ADMIN — INSULIN ASPART 2 UNITS: 100 INJECTION, SOLUTION INTRAVENOUS; SUBCUTANEOUS at 08:32

## 2020-10-05 RX ADMIN — INSULIN ASPART 4 UNITS: 100 INJECTION, SOLUTION INTRAVENOUS; SUBCUTANEOUS at 12:56

## 2020-10-05 ASSESSMENT — MIFFLIN-ST. JEOR: SCORE: 1349.57

## 2020-10-05 NOTE — PROGRESS NOTES
Care Management Discharge Note    Discharge Planning:  Expected Discharge Date: 10/05/20(TCU)  Concerns to be Addressed: discharge     Anticipated Discharge Disposition:  TCU, [facility]  Anticipated Discharge Services:  TCU  Anticipated Discharge DME:  NA    Patient/family educated on Medicare website which has current facility and service quality ratings:  yes  Referrals Placed by CM/SW:  Post acute and transportation  Education Provided on the Discharge Plan:    Patient/Family in Agreement with the Plan:    Disposition Comments:  Patient accepted to [facility] today. Spoke to brother in law to confirm plan per patient request and he is in agreement. Confirmed patient needs transportation arranged and Miguel is in agreement. Discussed possible costs and he continues to agree. Call to  [employer] transport and wheelchair ride scheduled for today at 1800. Patient updated and in agreement. Orders received and faxed to facility. PAS completed and faxed.     PAS-RR    D: Per DHS regulation, SW completed and submitted PAS-RR to [place] [employer] Direct Connect via the Senior LinkAge Line.  PAS-RR confirmation # is : [id number]    I: SW spoke with patient and they are aware a PAS-RR has been submitted.  SW reviewed with patient that they may be contacted for a follow up appointment within 10 days of hospital discharge if their SNF stay is < 30 days.  Contact information for [facility] LinkAge Line was also provided.    A: patient verbalized understanding.    P: Further questions may be directed to [facility] LinkAge Line at #[phone], option #4 for PAS-RR staff.        EDIS Hutchinson

## 2020-10-05 NOTE — PLAN OF CARE
Pt up with 1 and cane.  Oxycodone for pain.  Sling to RUE.  Plan to discharge to [facility] @ 1800 via [facility].

## 2020-10-05 NOTE — DISCHARGE INSTRUCTIONS
Plan of care for intertrigo base of coccyx crease; change dressing on Monday/Thurs and prn  1. Clean daily with soap and water. Pat dry  2. Foam dressing (per facility formulary)  3. Switch to barrier ointment when area healed.

## 2020-10-05 NOTE — UTILIZATION REVIEW
Concurrent stay review; Secondary Review Determination    Under the authority of the Utilization Management Committee, the utilization review process indicated a secondary review on the above patient. The review outcome is based on review of the medical records, discussions with staff, and applying clinical experience noted on the date of the review.    (x) Observation Status Appropriate - Concurrent stay review        RATIONALE FOR DETERMINATION: 75-year-old female in the process of moving her house suffered a fall resulting in a closed fracture of the distal end of the right radius, felt to be a nonsurgical fracture.  Observation care appropriate for pain control, mobility assessment, as well as initiating treatment for new diagnosis of type 2 diabetes.  Patient awaiting TCU placement.    Patient is clinically improving and there is no clear indication to change patient's status to inpatient. The severity of illness, intensity of service provided, expected LOS and risk for adverse outcome make the care appropriate for observation.    This document was produced using voice recognition software    The information on this document is developed by the utilization review team in order for the business office to ensure compliance. This only denotes the appropriateness of proper admission status and does not reflect the quality of care rendered.    The definitions of Inpatient Status and Observation Status used in making the determination above are those provided in the CMS Coverage Manual, Chapter 1 and Chapter 6, section 70.4.    Sincerely,    Andre Garcia MD  Utilization Review  Physician Advisor  [facility].

## 2020-10-05 NOTE — DISCHARGE SUMMARY
[facility]  Hospitalist Discharge Summary     Admit Date:  10/3/2020  Discharge Date:     10/5/2020  Discharging Provider: Joshua Mancia PA-C    PRIMARY CARE PROVIDER:    No Ref-Primary, Physician     DISCHARGE DIAGNOSES:   Distal right radius fracture  Status-post mechanical fall  UTI, E.coli  Coccyx wound, POA  Diabetes Mellitus Type II, new diagnosis     DISCHARGE MEDICATIONS:       Review of your medicines      START taking      Dose / Directions   acetaminophen 325 MG tablet  Commonly known as: TYLENOL      Dose: 975 mg  Take 3 tablets (975 mg) by mouth 3 times daily  Quantity:    Refills: 0     alcohol swab prep pads  Used for: Type 2 diabetes mellitus without complication, unspecified whether long term insulin use (H)      Use to swab area of injection/pricila as directed.  Quantity: 100 each  Refills: 3     blood glucose calibration solution  Commonly known as: NO BRAND SPECIFIED  Used for: Type 2 diabetes mellitus without complication, unspecified whether long term insulin use (H)      Use to calibrate blood glucose monitor as needed as directed.  Quantity: 1 Bottle  Refills: 3     blood glucose monitoring meter device kit  Commonly known as: NO BRAND SPECIFIED  Used for: Type 2 diabetes mellitus without complication, unspecified whether long term insulin use (H)      Use to test blood sugar 4 times daily or as directed.  Quantity: 1 kit  Refills: 0     blood glucose test strip  Commonly known as: NO BRAND SPECIFIED  Used for: Type 2 diabetes mellitus without complication, unspecified whether long term insulin use (H)      Use to test blood sugar 4 times daily or as directed.  Quantity: 100 strip  Refills: 6     cefpodoxime 200 MG tablet  Commonly known as: VANTIN  Used for: Urinary tract infection without hematuria, site unspecified      Dose: 200 mg  Take 1 tablet (200 mg) by mouth 2 times daily for 5 days  Quantity: 10 tablet  Refills: 0     insulin glargine 100 UNIT/ML  pen  Commonly known as: LANTUS PEN  Used for: Type 2 diabetes mellitus without complication, unspecified whether long term insulin use (H)      Dose: 12 Units  Start taking on: October 6, 2020  Inject 12 Units Subcutaneous every morning (before breakfast)  Refills: 0     metFORMIN 500 MG tablet  Commonly known as: GLUCOPHAGE  Used for: Type 2 diabetes mellitus without complication, unspecified whether long term insulin use (H)      Dose: 500 mg  Take 1 tablet (500 mg) by mouth daily (with dinner)  Quantity:    Refills: 0     miconazole 2 % external powder  Commonly known as: MICATIN  Used for: Candidiasis of skin      Apply topically every hour as needed for other (topical candidiasis)  Quantity:    Refills: 0     oxyCODONE 5 MG tablet  Commonly known as: ROXICODONE      Dose: 5 mg  Take 1 tablet (5 mg) by mouth every 4 hours as needed for moderate to severe pain  Quantity: 10 tablet  Refills: 0     thin lancets  Commonly known as: NO BRAND SPECIFIED  Used for: Type 2 diabetes mellitus without complication, unspecified whether long term insulin use (H)      Use with lanceting device.  Refills: 0        CONTINUE these medicines which have NOT CHANGED      Dose / Directions   aspirin 325 MG EC tablet  Commonly known as: ASA      Dose: 325 mg  Take 325 mg by mouth every 6 hours as needed for moderate pain  Refills: 0           Where to get your medicines      Some of these will need a paper prescription and others can be bought over the counter. Ask your nurse if you have questions.    Bring a paper prescription for each of these medications    oxyCODONE 5 MG tablet        ALLERGIES:    No Known Allergies    DISPOSITION:    Discharged to TCU.  Condition at discharge: Stable        General info for SNF    Length of Stay Estimate: Short Term Care: Estimated # of Days <30  Condition at Discharge: Stable  Level of care:skilled   Rehabilitation Potential: Fair  Admission H&P remains valid and up-to-date: Yes  Recent  Chemotherapy: N/A  Use Nursing Home Standing Orders: Yes     Mantoux instructions    Give two-step Mantoux (PPD) Per Facility Policy Yes     Reason for your hospital stay    You were at [facility] due to mechanical fall resulting in right distal radial fracture.  You were seen by an Orthopedic specialist.  You were found to have new onset diabetes and have been started on Insulin (Lantus) and metformin. You are being sent to TCU for rehab.     Glucose monitor nursing POCT    Before meals and at bedtime     Intake and output    Every shift     Daily weights    Call Provider for weight gain of more than 2 pounds per day or 5 pounds per week.     Follow Up and recommended labs and tests    Follow up with Nursing home physician.  Monitor blood glucose and adjust insulin management.      Follow up with primary care provider in once discharged from TCU.  Will need a glucometer and establish with diabetes educator and close follow-up.  No follow up labs or test are needed.    Follow up with Orthopedics in 1-2 weeks, Dr. Pires.     Activity - Up with nursing assistance     Weight bearing status    Continue right upper extremity splint.     Full Code    Discussed by admitting provider.     Physical Therapy Adult Consult    Evaluate and treat as clinically indicated.    Reason:  Physical deconditioning and recent fall resulting in right distal radial fracture.     Occupational Therapy Adult Consult    Evaluate and treat as clinically indicated.    Reason:  Physical deconditioning and recent fall resulting in right distal radial fracture.     Fall precautions     Advance Diet as Tolerated    Follow this diet upon discharge: Orders Placed This Encounter      Moderate Consistent CHO Diet        CONSULTS:     PHYSICAL THERAPY ADULT IP CONSULT  OCCUPATIONAL THERAPY ADULT IP CONSULT  SOCIAL WORK IP CONSULT  ORTHOPEDIC SURGERY IP CONSULT  WOUND OSTOMY CONTINENCE NURSE  IP CONSULT  PHARMACY LIAISON FOR MEDICATION COVERAGE  CONSULT  PHYSICAL THERAPY ADULT IP CONSULT  OCCUPATIONAL THERAPY ADULT IP CONSULT     LABORATORY IMAGING AND PROCEDURES:   Laboratory studies that included CBC with platelet differential, UA, Urine culture, CMP, INR, COVID-19 PCR, BMP, CBC with platelets, Serial blood glucose monitoring.  Imaging studies that included right wrist Xray, EKG.       PENDING RESULTS:    None.     PHYSICAL EXAMINATION ON DAY OF DISCHARGE:    Please review progress note as written earlier today.      BRIEF HISTORY OF PRESENT ILLNESS:    Isabella Prather is a 75 year old female who was admitted on 10/3/2020.     No significant past medical history who presents for evaluation of wrist pain following a fall.  She was registered to observation for further management.       HOSPITAL COURSE:     Distal right radius fracture  Status-post mechanical fall  Initially presented to [facility] orthopedics for wrist pain following a fall. Treatment there remains unclear; patient ultimately presented to [facility] due to concerns of home safety with injury.   -S/p reduction and splinting in ED. Plain film with post-reduction films indicate mild dorsal displacement, similar to prior exam, splint in place.  --Pain control with scheduled tylenol, PRN oxycodone.  --PT/OT/SW, anticipate patient would benefit from TCU.  --Orthopedic consult appreciated:   --Non-op management, follow-up with hand/upper extremity specialist in 1-2 weeks.  --Splint and elevate.     UTI, E.coli:  Mild dysuria and UA with some bacteruria. Non-septic appearing. No fever. Normal WBC. UCx positive for >100k E.coli that is pan-sensitive.    -IV Rocephin (2 doses administered).  --Plan to discharge on PO cefpodoxime 100 mg BID for 5 days.       Coccyx wound, POA:  Unclear duration, patient indicated she sustained wound upon falling.   Sister concerned that it has been there longer. Conflicting reports of patient's mobility within the past year - some notes indicate patient stating she has  largely been bedbound, others indicate she is ambulatory with cane. Patient currently stating she is ambulatory and does not have safety concerns at baseline. On exam, mepilex in place, ecchymosis present to superior gluteal folds R > L.  --WOC consult to evaluate.     Diabetes Mellitus Type II, new diagnosis:  Admission BG value of 435, UA with >1000 glucose present. Hgb A1c 13.1%, confirming diagnosis of diabetes. Previous [facility] Hospitalist had an extensive discussion with patient and sister was present at bedside regarding indication to start treatment with insulin.   --Start Lantus 10u daily, will likely need to uptitrate for improved control.   --Will discharge with 12 units of Lantus, metformin 500 mg daily, blood glucose checks and ordered a glucometer.    --Metformin 500 mg daily started.  --Medium sliding scale insulin while hospitalized.    --Accuchecks TID AC & at bedtime.  --Mod CHO diet.  --Will need to establish care with a PCP for further management, monitoring; discussed with RN, Care coordinator and patient.  --Pharm liason consult for insulin coverage as pt is concerned re:cost.  --May benefit from DM educator referral at discharge.  --Hypoglycemic protocol in place.      Diet: Moderate Consistent CHO Diet     DVT Prophylaxis: Low Risk/Ambulatory with no VTE prophylaxis indicated   Reyez Catheter: not present  Code Status: Full Code        The patient was discussed with Dr. Melendez who agrees with discharge at this time.  Dr. Melendez will evaluate the patient independently.      TOTAL DISCHARGE TIME:  Joshua LUIS PA-C, personally saw the patient today and spent less than or equal to 30 minutes discharging this patient.    Joshua Mancia PA-C  [facility]

## 2020-10-05 NOTE — PROGRESS NOTES
[facility]    Hospitalist Progress Note    Assessment & Plan   Isabella Prather is a 75 year old female who was admitted on 10/3/2020.     No significant past medical history who presents for evaluation of wrist pain following a fall.  She was registered to observation for further management.      Distal right radius fracture  Status-post mechanical fall  Initially presented to [facility] orthopedics for wrist pain following a fall. Treatment there remains unclear; patient ultimately presented to [facility] due to concerns of home safety with injury.   -S/p reduction and splinting in ED. Plain film with post-reduction films indicate mild dorsal displacement, similar to prior exam, splint in place.  --Pain control with scheduled tylenol, PRN oxycodone.  --PT/OT/SW, anticipate patient would benefit from TCU.  --Orthopedic consult appreciated:   --Non-op management, follow-up with hand/upper extremity specialist in 1-2 weeks.  --Splint and elevate.     UTI, E.coli:  Mild dysuria and UA with some bacteruria. Non-septic appearing. No fever. Normal WBC. UCx positive for >100k E.coli that is pan-sensitive.    -IV Rocephin (2 doses administered).  --Plan to discharge on PO cefpodoxime 100 mg BID for 5 days.       Coccyx wound, POA:  Unclear duration, patient indicated she sustained wound upon falling.   Sister concerned that it has been there longer. Conflicting reports of patient's mobility within the past year - some notes indicate patient stating she has largely been bedbound, others indicate she is ambulatory with cane. Patient currently stating she is ambulatory and does not have safety concerns at baseline. On exam, mepilex in place, ecchymosis present to superior gluteal folds R > L.  --WO consult to evaluate.     Diabetes Mellitus Type II, new diagnosis:  Admission BG value of 435, UA with >1000 glucose present. Hgb A1c 13.1%, confirming diagnosis of diabetes. Previous rounding Hospitalist had  an extensive discussion with patient and sister was present at bedside regarding indication to start treatment with insulin.   --Start Lantus 10u daily, will likely need to uptitrate for improved control.  --Metformin 500 mg daily started.  --Medium sliding scale insulin.    --Accuchecks TID AC & at bedtime.  --Mod CHO diet.  --Will need to establish care with a PCP for further management, monitoring; discussed with RN, Care coordinator and patient.  --Pharm liason consult for insulin coverage as pt is concerned re:cost.  --May benefit from DM educator referral at discharge.  --Hypoglycemic protocol in place.      Diet: Moderate Consistent CHO Diet     DVT Prophylaxis: Low Risk/Ambulatory with no VTE prophylaxis indicated   Reyez Catheter: not present  Code Status: Full Code     Disposition Plan    Expected discharge: Today or tomorrow, recommended to transitional care unit once safe disposition plan/ TCU bed available.   Entered: Joshua Mancia PA-C 10/05/2020, 5:18 AM          The patient has been discussed with Dr. Melendez, who agrees with the assessment and plan at this time.  Dr. Melendez will evaluate the patient independently.      Wilton Mancia PA-C   [phone]    Interval History   Patient was seated at the edge of the bed upon arrival.  She denied fever, chills, chest pain, shortness of breath or abdominal pain.  She continues to have occasional sharp pain in her right arm.      She indicated that before coming in she was having urinary frequency and was using pads frequently.  This has improved since starting antibiotics.  Informed her she was receiving ceftriaxone and she believed she was getting Ancef.  Described that they were sister drugs.      Discussed and reviewed diabetes plan.  Informed her of the names of what medications she was started.  She requested that all of this be written down.  She was informed all of this would be in her discharge paper work.      Patient also indicated that  she has broken teeth and a cataract.  The cataract is effecting her mobility and some of her daily tasks.  She stated she knows she needs to get them all fixed but she is hesitant to proceed as she has concerns regarding doctors.      -Data reviewed today: I reviewed all new labs and imaging results over the last 24 hours. I personally reviewed no images or EKG's today.    Physical Exam   Temp: 98.4  F (36.9  C) Temp src: Oral BP: 124/68 Pulse: 94   Resp: 14 SpO2: 95 % O2 Device: None (Room air)    Vitals:    10/03/20 1536   Weight: 77.6 kg (171 lb)     Vital Signs with Ranges  Temp:  [98.1  F (36.7  C)-98.6  F (37  C)] 98.4  F (36.9  C)  Pulse:  [94] 94  Resp:  [14-16] 14  BP: (124-126)/(61-71) 124/68  SpO2:  [93 %-99 %] 95 %  No intake/output data recorded.      Constitutional: Awake, alert, cooperative, no apparent distress.    ENT: Normocephalic, without obvious abnormality, atraumatic, oral pharynx with moist mucus membranes, tonsils without erythema or exudates.  Neck: Supple, symmetrical, trachea midline, no adenopathy.  Pulmonary: No increased work of breathing, good air exchange, clear to auscultation bilaterally, no crackles or wheezing.  Cardiovascular: Regular rate and rhythm, normal S1 and S2, no S3 or S4, and no murmur noted.  GI: Normal bowel sounds, soft, non-distended, non-tender.  Skin/Integumen: Clear.  Neuro: CN II-XII grossly intact.  Psych:  Alert and oriented x 3. Normal affect.  Extremities: No lower extremity edema noted, and calves are non-TTP bilaterally. Right upper extremity splinted and in a sling.        Medications       acetaminophen  975 mg Oral TID     cefTRIAXone  1 g Intravenous Q24H     insulin aspart  1-7 Units Subcutaneous TID AC     insulin aspart  1-5 Units Subcutaneous At Bedtime     insulin glargine  10 Units Subcutaneous QAM AC     metFORMIN  500 mg Oral Daily with supper     sodium chloride (PF)  3 mL Intracatheter Q8H       Data   Recent Labs   Lab 10/04/20  0704  10/03/20  1722   WBC 8.7 12.3*   HGB 12.5 14.3   MCV 93 93    222   INR  --  1.13    136   POTASSIUM 3.7 3.8   CHLORIDE 106 105   CO2 25 26   BUN 10 10   CR 0.52 0.56   ANIONGAP 6 5   MENDEZ 8.4* 9.0   * 435*   ALBUMIN  --  3.5   PROTTOTAL  --  7.5   BILITOTAL  --  0.6   ALKPHOS  --  143   ALT  --  75*   AST  --  73*       No results found for this or any previous visit (from the past 24 hour(s)).

## 2020-10-05 NOTE — PLAN OF CARE
Pt alert & oriented, lethargic at times. VSS on RA, afebrile, pain managed by oxycodone. R wrist casted. Up with SBA using a cane, denies help with hygiene cares. IV SL. Voiding adequately in the BR. Will continue to monitor

## 2020-10-05 NOTE — CONSULTS
[facility] WO Nurse Inpatient Wound Assessment       WOC NURSE ASSESSMENT    Suspected PI coccyx:  Intertrigo r/t moisture, no PI noted.   WO NURSE TREATMENT PLAN    Plan of care for intertrigo base of coccyx crease; change dressing on Monday/Thurs and prn  1. Clean daily with soap and water. Pat dry  2. Foam dressing (per facility formulary)  3. Switch to barrier ointment when area healed.     Nursing to notify Provider(s) and re-consult the WO Nurse if wound(s) deteriorate or new skin concerns    OBJECTIVE DATA    Patient history according to provider notes:   DISCHARGE DIAGNOSES:   Distal right radius fracture  Status-post mechanical fall  UTI, E.coli  Coccyx wound, POA  Diabetes Mellitus Type II, new diagnosis    Ezequiel Risk Assessment  Sensory Perception: 4-->no impairment    Moisture: 4-->rarely moist   Activity: 3-->walks occasionally     Mobility: 3-->slightly limited   Nutrition: 3-->adequate   Friction and Shear: 3-->no apparent problem      Positioning: Moiblize     Mattress:  Atmos air    Current diet  Orders Placed This Encounter      Moderate Consistent CHO Diet      Advance Diet as Tolerated      Labs:   Recent Labs   Lab Test 10/04/20  0704 10/03/20  1722   ALBUMIN  --  3.5   HGB 12.5 14.3   INR  --  1.13   WBC 8.7 12.3*   A1C 13.1*  --          WO NURSE PHYSICAL EXAM    Wound Assessment (location): Coccyx  Base of coccyx crease: Linear 3.0cm x .3cm x superficial with red base  Angeline-wound skin: blanchable erythema  Drainage:  none  Odor: none  Pain:  tenderness  Temperature: normal    WO NURSE INTERVENTIONS    Assessed completed  Wound Care: Mepilex sacral to area  Supplies: 1 x dressing with patient  Discussed plan of care with patient and nursing  Education provided: patient  AVS completed    Arlen Benítez RN [facility]

## 2020-10-05 NOTE — PHARMACY-RX INSURANCE COVERAGE
Coverage check on insulin.  Patient has Medicare D through [facility] with $400 unmet deductible.      First fill of any insulin will include the $400 deductible + copay.    Formulary ($47/mo after deductible met)  Novolog  Lantus  NovoLOG 70/30 mix  Humulin 70/30  Novolin N  Novolin R    Nonformulary  Humalog  Insulin lispro generic  Basaglar  NovoLIN 70/30 germaine Fowler, Pharmacy Technician/Liaison, Discharge Pharmacy [phone]

## 2020-10-06 ENCOUNTER — NURSING HOME VISIT (OUTPATIENT)
Dept: GERIATRICS | Facility: CLINIC | Age: 75
End: 2020-10-06
Payer: COMMERCIAL

## 2020-10-06 VITALS
BODY MASS INDEX: 28.09 KG/M2 | RESPIRATION RATE: 18 BRPM | SYSTOLIC BLOOD PRESSURE: 119 MMHG | HEART RATE: 79 BPM | OXYGEN SATURATION: 97 % | WEIGHT: 179 LBS | DIASTOLIC BLOOD PRESSURE: 74 MMHG | TEMPERATURE: 97.1 F | HEIGHT: 67 IN

## 2020-10-06 DIAGNOSIS — T14.8XXA PAIN DUE TO FRACTURE: ICD-10-CM

## 2020-10-06 DIAGNOSIS — H26.9 CATARACT OF RIGHT EYE, UNSPECIFIED CATARACT TYPE: ICD-10-CM

## 2020-10-06 DIAGNOSIS — N39.0 E-COLI UTI: ICD-10-CM

## 2020-10-06 DIAGNOSIS — R53.81 PHYSICAL DECONDITIONING: ICD-10-CM
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
## 2020-10-06 DIAGNOSIS — K59.03 DRUG-INDUCED CONSTIPATION: ICD-10-CM

## 2020-10-06 DIAGNOSIS — E11.9 NEW ONSET TYPE 2 DIABETES MELLITUS (H): ICD-10-CM

## 2020-10-06 DIAGNOSIS — R26.89 BALANCE PROBLEMS: ICD-10-CM

## 2020-10-06 DIAGNOSIS — S52.501E TYPE I OR II OPEN FRACTURE OF DISTAL END OF RIGHT RADIUS WITH ROUTINE HEALING, UNSPECIFIED FRACTURE MORPHOLOGY, SUBSEQUENT ENCOUNTER: Primary | ICD-10-CM

## 2020-10-06 DIAGNOSIS — S31.809D OPEN WOUND OF BUTTOCK, UNSPECIFIED LATERALITY, SUBSEQUENT ENCOUNTER: ICD-10-CM

## 2020-10-06 DIAGNOSIS — B96.20 E-COLI UTI: ICD-10-CM

## 2020-10-06 PROCEDURE — 99310 SBSQ NF CARE HIGH MDM 45: CPT | Performed by: NURSE PRACTITIONER

## 2020-10-06 RX ORDER — ACETAMINOPHEN 325 MG/1
650 TABLET ORAL 4 TIMES DAILY PRN
COMMUNITY
End: 2020-10-20

## 2020-10-06 RX ORDER — POLYETHYLENE GLYCOL 3350 17 G/17G
8.5 POWDER, FOR SOLUTION ORAL DAILY PRN
COMMUNITY

## 2020-10-06 RX ORDER — AMOXICILLIN 250 MG
1 CAPSULE ORAL 2 TIMES DAILY
COMMUNITY

## 2020-10-06 NOTE — PROGRESS NOTES
[facility]  PRIMARY CARE PROVIDER AND CLINIC:  Physician No Ref-Primary, No address on file  Chief Complaint   Patient presents with     Establish Care     [place] Medical Record Number:  [id number]  Place of Service where encounter took place:  [facility] ([facility] [[id number]]    Isabella Prather  is a 75 year old  (1945), admitted to the above facility from  [facility]. Hospital stay 10/3/20 through 10/5/20..  Admitted to this facility for  rehab, medical management and nursing care      Type I or II open fracture of distal end of right radius with routine healing, unspecified fracture morphology, subsequent encounter  Pain due to fracture  Fall, sequela  Balance problems  Physical deconditioning  New onset type 2 diabetes mellitus (H)  Open wound of buttock, unspecified laterality, subsequent encounter  Drug-induced constipation  E-coli UTI  Cataract of right eye, unspecified cataract type      HPI:    HPI information obtained from: facility chart records, facility staff, patient report and [facility] chart review.     Brief Summary of Hospital Course: pt fell down 8 stairs while packing her place up in anticipation of moving in with her sister in [place], [place] as she needs a little help, in general(no specific info was noted*).  This occurred on 10/3, she went to [facility]--they found a right distal radius Fx and minimal ulnar Fx--put her in splint and sent her home. She went to the ER as she felt she might need further eval and surgery.  Seen by ortho--treating it non surgically at this time but wish her to see Dr Pires in 1-2 weeks. She was found to have a A1C of 13.1 and new dx of DM--pout in insulin and metformin. She was found to have a open area on her bottom--seen by WOCN. She was sent to rehab as her sister did not feels she could manage it at home with the upcoming move.    Updates on Status Since Skilled nursing Admission and HPI:   She has not seen a doctor or provider in ~~5 years.  At that time she needed cataract surgery also but did not pursue it. She only sees shadows in right eye.  She has used a cane for about a year as has felt her balance is off and is afraid of falling.   She said she needs her teeth fixed also for some time as broke some with a fall in the past.  TODAY DURING EXAM/ROS:  No CP, SOB, Cough fevers, chills, HA, N/V, dysuria.  She is constipated. Appetite is fair.  No pain except right arm, no current dizziness but afraid she will fall. Also had frequency for some times, feels that is better now.        CODE STATUS/ADVANCE DIRECTIVES DISCUSSION:   CPR/Full code   Patient's living condition: lives alone but moving in with sister  ALLERGIES: Patient has no known allergies.  PAST MEDICAL HISTORY:  has no past medical history on file.  PAST SURGICAL HISTORY:   has no past surgical history on file.  FAMILY HISTORY: family history includes No Known Problems in her father and mother.  SOCIAL HISTORY:   reports that she has quit smoking. She has a 20.00 pack-year smoking history. She has never used smokeless tobacco. She reports current alcohol use. She reports that she does not use drugs.    Post Discharge Medication Reconciliation Status: discharge medications reconciled and changed, per note/orders    Current Outpatient Medications   Medication Sig Dispense Refill     acetaminophen (TYLENOL) 325 MG tablet Take 650 mg by mouth 4 times daily as needed for mild pain       alcohol swab prep pads Use to swab area of injection/pricila as directed. 100 each 3     aspirin (ASA) 325 MG EC tablet Take 325 mg by mouth every 6 hours as needed for moderate pain       blood glucose (NO BRAND SPECIFIED) test strip Use to test blood sugar 4 times daily or as directed. (Patient taking differently: Use to test blood sugar 4 times daily AND PRN or as directed. CALL IF >250) 100 strip 6     blood glucose calibration (NO BRAND SPECIFIED) solution  "Use to calibrate blood glucose monitor as needed as directed. 1 Bottle 3     blood glucose monitoring (NO BRAND SPECIFIED) meter device kit Use to test blood sugar 4 times daily or as directed. 1 kit 0     cefpodoxime (VANTIN) 200 MG tablet Take 1 tablet (200 mg) by mouth 2 times daily for 5 days 10 tablet 0     insulin glargine (LANTUS PEN) 100 UNIT/ML pen Inject 12 Units Subcutaneous every morning (before breakfast)       metFORMIN (GLUCOPHAGE) 500 MG tablet Take 1 tablet (500 mg) by mouth daily (with dinner)       miconazole (MICATIN) 2 % external powder Apply topically every hour as needed for other (topical candidiasis)       oxyCODONE (ROXICODONE) 5 MG tablet Take 1 tablet (5 mg) by mouth every 4 hours as needed for moderate to severe pain 10 tablet 0     polyethylene glycol (MIRALAX) 17 g packet Take 17 g by mouth daily as needed for constipation MIX with full glass of fluid daily prn       senna-docusate (SENOKOT-S/PERICOLACE) 8.6-50 MG tablet Take 1 tablet by mouth 2 times daily       thin (NO BRAND SPECIFIED) lancets Use with lanceting device.             ROS:see above.  Vitals:  /74   Pulse 79   Temp 97.1  F (36.2  C)   Resp 18   Ht 1.702 m (5' 7\")   Wt 81.2 kg (179 lb)   SpO2 97%   BMI 28.04 kg/m    Exam:  GENERAL APPEARANCE:  Alert, in no distress, appears healthy, oriented, cooperative  ENT:  Mouth and posterior oropharynx normal, moist mucous membranes, normal hearing acuity, poor dentition  EYES:  Conjunctiva and lids normal, poor vision right eye--only sees shadows  NECK:     RESP:  respiratory effort and palpation of chest normal, lungs clear to auscultation , no respiratory distress  CV:  Palpation and auscultation of heart done , regular rate and rhythm, no murmur, rub, or gallop, no edema, +2 pedal pulses  ABDOMEN:  normal bowel sounds, soft, nontender, no hepatosplenomegaly or other masses  M/S:   INFANTE except right arm in splint and sling, good CSM to fingers, and + mild " edema  SKIN:  Inspection of skin and subcutaneous tissue baseline, except large bruises on bottom and lower back  NEURO:   Cranial nerves 2-12 are normal tested and grossly at patient's baseline  PSYCH:  oriented X 3, normal insight, judgement and memory, affect and mood normal    Lab/Diagnostic data:  Recent Labs   Lab Test 10/04/20  0704 10/03/20  1722    136   POTASSIUM 3.7 3.8   CHLORIDE 106 105   CO2 25 26   ANIONGAP 6 5   * 435*   BUN 10 10   CR 0.52 0.56   MENDEZ 8.4* 9.0     CBC RESULTS:   Recent Labs   Lab Test 10/04/20  0704   WBC 8.7   RBC 3.86   HGB 12.5   HCT 35.9   MCV 93   MCH 32.4   MCHC 34.8   RDW 12.3        ASSESSMENT / PLAN:  (S52.501E) Type I or II open fracture of distal end of right radius with routine healing, unspecified fracture morphology, subsequent encounter: 10/3/20  (primary encounter diagnosis)   (T14.8XXA) Pain due to fracture   (W19.XXXS) Fall, sequela  (R53.81) Physical deconditioning  Comment/Plan: PT OT oxycodone prn, does not want scheduled tylenol,  [facility] to make appt to see Dr Pires in 1-2 weeks    (R26.89) Balance problems  Comment: DM related? Cataract related  Plan: PT OT, uses cane, need to get cataracts fixed, chen on right. Needs to see a PCP and I spoke with sis about this--she will call with clinic in [place]    (E11.9) New onset type 2 diabetes mellitus (H)  Comment/Plan: insuline and metformin--pt would prefer no insulin,  But will teach accuchecks and insulin for now. Monitor.  A1C was 13.1    (S31.809D) Open wound of buttock, unspecified laterality, subsequent encounter  Comment/Plan: Chg RN to review for wound team and update me    (K59.03) Drug-induced constipation  Comment/Plan: senna S and Miralax    (N39.0,  B96.20) E-coli UTI  Comment/Plan: complete tx of Vantin, monitor.    (H26.9) Cataract of right eye, unspecified cataract type  Comment/Plan: outpt f/u      Total time spent with patient visit at the skilled nursing facility was 55  including patient visit, review of past records and phone call to patient contact, sister with pt on line also. Greater than 50% of total time spent with counseling and coordinating care due to new diagnoses, no recent MD visits--minimal records.     Electronically signed by:  FAY Glasgow CNP

## 2020-10-07 NOTE — PROGRESS NOTES
[facility]  INITIAL VISIT NOTE  October 8, 2020    PRIMARY CARE PROVIDER AND CLINIC:  No Ref-Primary, Physician No address on file    CHIEF COMPLAINT:  Hospital follow-up/Initial visit    HPI:    Isabella Prather is a 75 year old  (1945) female with no significant past medical history who was seen at [facility] in [place] on October 8, 2020 for an initial visit.     Summary of hospital course:  Patient was hospitalized at [facility] from October 3 through October 5, 2020 after she presented for evaluation after a fall and ongoing right wrist pain.  Reportedly she was packing up her house to move out of town when she fell down 8 steps in her house.  She was seen at [facility] orthopedics and x-ray revealed a distal right radius fracture.  Work-up in the ED revealed hyperglycemia with a blood glucose of 435.  UA was also abnormal and urine culture grew more than 100,000 colonies per mL pansensitive E. coli.  Test for COVID-19 was negative.  EKG showed normal sinus rhythm.  Patient underwent closed reduction and splinting in ED.  Ortho was consulted and recommended nonoperative management.  She was treated with IV ceftriaxone for 2 days for UTI with subsequent transition to Cefpodoxime upon discharge from hospital.  WOC RN was consulted for coccygeal wound.  She was started on metformin and insulin glargine for new diagnosis of diabetes.  Notably hemoglobin A1c was 13.1%.    Patient is admitted to this facility for medical management, nursing care, and rehab.     Today's visit:  Patient was seen today in her room, while lying in bed comfortably.  She states she was not able to sleep last night due to severe pain in her right wrist/forearm.  She currently rates her pain at 5-6 out of 10.  She does complain of constipation but was able to have 3 bowel movements yesterday.  She is adamantly refusing to take acetaminophen as she believes that it might have an adverse effect  on her kidneys despite my explanation that at the current dose it is quite safe.  She denies fever, chills, chest pain, palpitation, nausea, vomiting, abdominal pain, or urinary symptoms.  Her blood glucose levels are currently in the range of 230-350.      CODE STATUS:   CPR/Full code     PAST MEDICAL HISTORY:   DM type II, newly diagnosed  Fall resulting in distal right radius fracture  E. coli UTI  Coccygeal wound  Cataract    PAST SURGICAL HISTORY:   No past surgical history on file.    FAMILY HISTORY:   Family History   Problem Relation Age of Onset     No Known Problems Mother      No Known Problems Father        SOCIAL HISTORY:  Patient is single.  She has recently bought a house with her sister.  She does use a cane for ambulation.  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    Social History     Tobacco Use     Smoking status: Former Smoker     Packs/day: 1.00     Years: 20.00     Pack years: 20.00     Smokeless tobacco: Never Used   Substance Use Topics     Alcohol use: Yes     Frequency: 2-4 times a month     Binge frequency: Never       MEDICATIONS:  Current Outpatient Medications   Medication Sig Dispense Refill     acetaminophen (TYLENOL) 325 MG tablet Take 650 mg by mouth 4 times daily as needed for mild pain       alcohol swab prep pads Use to swab area of injection/pricila as directed. 100 each 3     aspirin (ASA) 325 MG EC tablet Take 325 mg by mouth every 6 hours as needed for moderate pain       blood glucose (NO BRAND SPECIFIED) test strip Use to test blood sugar 4 times daily or as directed. (Patient taking differently: Use to test blood sugar 4 times daily AND PRN or as directed. CALL IF >250) 100 strip 6     blood glucose calibration (NO BRAND SPECIFIED) solution Use to calibrate blood glucose monitor as needed as directed. 1 Bottle 3     blood glucose monitoring (NO BRAND SPECIFIED) meter device kit Use to test blood sugar 4 times daily or as directed. 1 kit 0     cefpodoxime (VANTIN)  200 MG tablet Take 1 tablet (200 mg) by mouth 2 times daily for 5 days 10 tablet 0     insulin glargine (LANTUS PEN) 100 UNIT/ML pen Inject 12 Units Subcutaneous every morning (before breakfast)       metFORMIN (GLUCOPHAGE) 500 MG tablet Take 1 tablet (500 mg) by mouth daily (with dinner)       miconazole (MICATIN) 2 % external powder Apply topically every hour as needed for other (topical candidiasis)       oxyCODONE (ROXICODONE) 5 MG tablet Take 1 tablet (5 mg) by mouth every 4 hours as needed for moderate to severe pain 10 tablet 0     polyethylene glycol (MIRALAX) 17 g packet Take 17 g by mouth daily as needed for constipation MIX with full glass of fluid daily prn       senna-docusate (SENOKOT-S/PERICOLACE) 8.6-50 MG tablet Take 1 tablet by mouth 2 times daily       thin (NO BRAND SPECIFIED) lancets Use with lanceting device.         ALLERGIES:  No Known Allergies    ROS:  10 point ROS neg other than the symptoms noted above in the HPI.    PHYSICAL EXAM:  Vital signs were reviewed in the chart.  Blood pressure 119/71, heart rate 79, respiratory rate 18, temperature 96.8  F, oxygen saturation 95%, weight 179.1 LBS  Gen: Comfortable and in no acute distress  HEENT: Normocephalic; oropharynx clear  Card: Normal S1, S2, RRR  Resp: Lungs clear to auscultation bilaterally  GI: Abdomen soft, non-tender, non-distended, +BS  Ext: Right wrist cast noted  Neuro: CX II-XII grossly intact; ROM in all four extremities grossly intact  Psych: Alert and oriented x3; normal affect  Skin: No acute rash    LABORATORY/IMAGING DATA:    Most Recent 3 CBC's:  Recent Labs   Lab Test 10/04/20  0704 10/03/20  1722   WBC 8.7 12.3*   HGB 12.5 14.3   MCV 93 93    222     Most Recent 3 BMP's:  Recent Labs   Lab Test 10/04/20  0704 10/03/20  1722    136   POTASSIUM 3.7 3.8   CHLORIDE 106 105   CO2 25 26   BUN 10 10   CR 0.52 0.56   ANIONGAP 6 5   MENDEZ 8.4* 9.0   * 435*     Most Recent 2 LFT's:  Recent Labs   Lab Test  10/03/20  1722   AST 73*   ALT 75*   ALKPHOS 143   BILITOTAL 0.6     Most Recent 3 INR's:  Recent Labs   Lab Test 10/03/20  1722   INR 1.13       ASSESSMENT/PLAN:  Fall, subsequent encounter,  Right distal radius fracture,  Physical deconditioning.  Patient underwent closed reduction and splinting in the emergency department.  Ortho recommended non-operative management.  Patient continues to have moderate to severe pain in her right wrist.  Patient is adamantly refusing to take acetaminophen as outlined in HPI.  Plan:  Continue the cast/splint  Discontinue acetaminophen and start ibuprofen 400 mg p.o. every 6 hours as needed  Continue PRN oxycodone for severe pain   Continue PT/OT evaluation and therapy  Follow-up with Dr. Pires in 1 to 2 weeks    DM type II, newly diagnosed.  Hemoglobin A1c was 13.1% on October 4.  She was started on metformin and insulin glargine in the hospital.  Her diabetes is poorly controlled and blood glucose is currently in the range of 230-350 mg/dL.  Plan:  Increase insulin glargine from 12 units subcu in the morning to 14 units subcu in the morning  Increase metformin from 500 mg p.o. daily to 500 mg p.o. twice daily  Continue sliding scale insulin  Continue diabetic diet  Continue to monitor blood glucose    E. coli UTI.  Patient is afebrile and asymptomatic.  Plan:  Continue Cefpodoxime 200 mg p.o. twice daily through October 10 as instructed    Coccygeal wound.  Plan  Continue Mepilex and wound care per instructions        Orders written by provider at facility:  Increase metformin to 500 mg p.o. twice daily  Increase Lantus to 14 units subcu in the morning  Start ibuprofen 400 mg p.o. 4 times daily as needed      Electronically signed by:  Mars Martinez MD

## 2020-10-08 ENCOUNTER — NURSING HOME VISIT (OUTPATIENT)
Dept: GERIATRICS | Facility: CLINIC | Age: 75
End: 2020-10-08
Payer: COMMERCIAL

## 2020-10-08 VITALS
WEIGHT: 178.6 LBS | BODY MASS INDEX: 28.03 KG/M2 | HEART RATE: 81 BPM | OXYGEN SATURATION: 94 % | DIASTOLIC BLOOD PRESSURE: 81 MMHG | RESPIRATION RATE: 18 BRPM | TEMPERATURE: 98.2 F | HEIGHT: 67 IN | SYSTOLIC BLOOD PRESSURE: 137 MMHG

## 2020-10-08 DIAGNOSIS — N39.0 E. COLI UTI: ICD-10-CM

## 2020-10-08 DIAGNOSIS — R53.81 PHYSICAL DECONDITIONING: ICD-10-CM

## 2020-10-08 DIAGNOSIS — S52.501D CLOSED FRACTURE OF DISTAL END OF RIGHT RADIUS WITH ROUTINE HEALING, UNSPECIFIED FRACTURE MORPHOLOGY, SUBSEQUENT ENCOUNTER: ICD-10-CM

## 2020-10-08 DIAGNOSIS — S31.809D OPEN WOUND OF BUTTOCK, UNSPECIFIED LATERALITY, SUBSEQUENT ENCOUNTER: ICD-10-CM
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
## 2020-10-08 DIAGNOSIS — B96.20 E. COLI UTI: ICD-10-CM

## 2020-10-08 DIAGNOSIS — E11.9 NEW ONSET TYPE 2 DIABETES MELLITUS (H): ICD-10-CM

## 2020-10-08 PROCEDURE — 99306 1ST NF CARE HIGH MDM 50: CPT | Performed by: INTERNAL MEDICINE

## 2020-10-08 RX ORDER — IBUPROFEN 400 MG/1
400 TABLET, FILM COATED ORAL 2 TIMES DAILY PRN
COMMUNITY
Start: 2020-11-05

## 2020-10-08 RX ORDER — INSULIN GLARGINE 100 [IU]/ML
6 INJECTION, SOLUTION SUBCUTANEOUS EVERY MORNING
COMMUNITY
Start: 2020-10-09 | End: 2020-11-03

## 2020-10-08 ASSESSMENT — MIFFLIN-ST. JEOR: SCORE: 1347.75

## 2020-10-08 NOTE — LETTER
10/8/2020        RE: Isabella Prather  [address]  [place] [address]        [facility]  INITIAL VISIT NOTE  October 8, 2020    PRIMARY CARE PROVIDER AND CLINIC:  No Ref-Primary, Physician No address on file    CHIEF COMPLAINT:  Hospital follow-up/Initial visit    HPI:    Isabella Prather is a 75 year old  (1945) female with no significant past medical history who was seen at [facility] in [place] on October 8, 2020 for an initial visit.     Summary of hospital course:  Patient was hospitalized at [facility] from October 3 through October 5, 2020 after she presented for evaluation after a fall and ongoing right wrist pain.  Reportedly she was packing up her house to move out of town when she fell down 8 steps in her house.  She was seen at [facility] orthopedics and x-ray revealed a distal right radius fracture.  Work-up in the ED revealed hyperglycemia with a blood glucose of 435.  UA was also abnormal and urine culture grew more than 100,000 colonies per mL pansensitive E. coli.  Test for COVID-19 was negative.  EKG showed normal sinus rhythm.  Patient underwent closed reduction and splinting in ED.  Ortho was consulted and recommended nonoperative management.  She was treated with IV ceftriaxone for 2 days for UTI with subsequent transition to Cefpodoxime upon discharge from hospital.  WOC RN was consulted for coccygeal wound.  She was started on metformin and insulin glargine for new diagnosis of diabetes.  Notably hemoglobin A1c was 13.1%.    Patient is admitted to this facility for medical management, nursing care, and rehab.     Today's visit:  Patient was seen today in her room, while lying in bed comfortably.  She states she was not able to sleep last night due to severe pain in her right wrist/forearm.  She currently rates her pain at 5-6 out of 10.  She does complain of constipation but was able to have 3 bowel movements yesterday.  She is  adamantly refusing to take acetaminophen as she believes that it might have an adverse effect on her kidneys despite my explanation that at the current dose it is quite safe.  She denies fever, chills, chest pain, palpitation, nausea, vomiting, abdominal pain, or urinary symptoms.  Her blood glucose levels are currently in the range of 230-350.      CODE STATUS:   CPR/Full code     PAST MEDICAL HISTORY:   DM type II, newly diagnosed  Fall resulting in distal right radius fracture  E. coli UTI  Coccygeal wound  Cataract    PAST SURGICAL HISTORY:   No past surgical history on file.    FAMILY HISTORY:   Family History   Problem Relation Age of Onset     No Known Problems Mother      No Known Problems Father        SOCIAL HISTORY:  Patient is single.  She has recently bought a house with her sister.  She does use a cane for ambulation.  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    Social History     Tobacco Use     Smoking status: Former Smoker     Packs/day: 1.00     Years: 20.00     Pack years: 20.00     Smokeless tobacco: Never Used   Substance Use Topics     Alcohol use: Yes     Frequency: 2-4 times a month     Binge frequency: Never       MEDICATIONS:  Current Outpatient Medications   Medication Sig Dispense Refill     acetaminophen (TYLENOL) 325 MG tablet Take 650 mg by mouth 4 times daily as needed for mild pain       alcohol swab prep pads Use to swab area of injection/pricila as directed. 100 each 3     aspirin (ASA) 325 MG EC tablet Take 325 mg by mouth every 6 hours as needed for moderate pain       blood glucose (NO BRAND SPECIFIED) test strip Use to test blood sugar 4 times daily or as directed. (Patient taking differently: Use to test blood sugar 4 times daily AND PRN or as directed. CALL IF >250) 100 strip 6     blood glucose calibration (NO BRAND SPECIFIED) solution Use to calibrate blood glucose monitor as needed as directed. 1 Bottle 3     blood glucose monitoring (NO BRAND SPECIFIED) meter device  kit Use to test blood sugar 4 times daily or as directed. 1 kit 0     cefpodoxime (VANTIN) 200 MG tablet Take 1 tablet (200 mg) by mouth 2 times daily for 5 days 10 tablet 0     insulin glargine (LANTUS PEN) 100 UNIT/ML pen Inject 12 Units Subcutaneous every morning (before breakfast)       metFORMIN (GLUCOPHAGE) 500 MG tablet Take 1 tablet (500 mg) by mouth daily (with dinner)       miconazole (MICATIN) 2 % external powder Apply topically every hour as needed for other (topical candidiasis)       oxyCODONE (ROXICODONE) 5 MG tablet Take 1 tablet (5 mg) by mouth every 4 hours as needed for moderate to severe pain 10 tablet 0     polyethylene glycol (MIRALAX) 17 g packet Take 17 g by mouth daily as needed for constipation MIX with full glass of fluid daily prn       senna-docusate (SENOKOT-S/PERICOLACE) 8.6-50 MG tablet Take 1 tablet by mouth 2 times daily       thin (NO BRAND SPECIFIED) lancets Use with lanceting device.         ALLERGIES:  No Known Allergies    ROS:  10 point ROS neg other than the symptoms noted above in the HPI.    PHYSICAL EXAM:  Vital signs were reviewed in the chart.  Blood pressure 119/71, heart rate 79, respiratory rate 18, temperature 96.8  F, oxygen saturation 95%, weight 179.1 LBS  Gen: Comfortable and in no acute distress  HEENT: Normocephalic; oropharynx clear  Card: Normal S1, S2, RRR  Resp: Lungs clear to auscultation bilaterally  GI: Abdomen soft, non-tender, non-distended, +BS  Ext: Right wrist cast noted  Neuro: CX II-XII grossly intact; ROM in all four extremities grossly intact  Psych: Alert and oriented x3; normal affect  Skin: No acute rash    LABORATORY/IMAGING DATA:    Most Recent 3 CBC's:  Recent Labs   Lab Test 10/04/20  0704 10/03/20  1722   WBC 8.7 12.3*   HGB 12.5 14.3   MCV 93 93    222     Most Recent 3 BMP's:  Recent Labs   Lab Test 10/04/20  0704 10/03/20  1722    136   POTASSIUM 3.7 3.8   CHLORIDE 106 105   CO2 25 26   BUN 10 10   CR 0.52 0.56   ANIONGAP  6 5   MENDEZ 8.4* 9.0   * 435*     Most Recent 2 LFT's:  Recent Labs   Lab Test 10/03/20  1722   AST 73*   ALT 75*   ALKPHOS 143   BILITOTAL 0.6     Most Recent 3 INR's:  Recent Labs   Lab Test 10/03/20  1722   INR 1.13       ASSESSMENT/PLAN:  Fall, subsequent encounter,  Right distal radius fracture,  Physical deconditioning.  Patient underwent closed reduction and splinting in the emergency department.  Ortho recommended non-operative management.  Patient continues to have moderate to severe pain in her right wrist.  Patient is adamantly refusing to take acetaminophen as outlined in HPI.  Plan:  Continue the cast/splint  Discontinue acetaminophen and start ibuprofen 400 mg p.o. every 6 hours as needed  Continue PRN oxycodone for severe pain   Continue PT/OT evaluation and therapy  Follow-up with Dr. Pires in 1 to 2 weeks    DM type II, newly diagnosed.  Hemoglobin A1c was 13.1% on October 4.  She was started on metformin and insulin glargine in the hospital.  Her diabetes is poorly controlled and blood glucose is currently in the range of 230-350 mg/dL.  Plan:  Increase insulin glargine from 12 units subcu in the morning to 14 units subcu in the morning  Increase metformin from 500 mg p.o. daily to 500 mg p.o. twice daily  Continue diabetic diet  Continue to monitor blood glucose    E. coli UTI.  Patient is afebrile and asymptomatic.  Plan:  Continue Cefpodoxime 200 mg p.o. twice daily through October 10 as instructed    Coccygeal wound.  Plan  Continue Mepilex and wound care per instructions        Orders written by provider at facility:  Increase metformin to 500 mg p.o. twice daily  Increase Lantus to 14 units subcu in the morning  Start ibuprofen 400 mg p.o. 4 times daily as needed      Electronically signed by:  Mars Martinez MD                          Sincerely,        Mars Martinez MD

## 2020-10-13 ASSESSMENT — MIFFLIN-ST. JEOR: SCORE: 1345.49

## 2020-10-14 NOTE — PROGRESS NOTES
[facility]    Chief Complaint   Patient presents with     senior living Acute       [facility] Medical Record Number:  [id number]  Place of Service where encounter took place:  [facility] ([facility] [[id number]]    HPI:    Isabella Prather is a 75 year old  (1945), who is being seen today for an episodic care visit.  HPI information obtained from: facility chart records, facility staff, patient report and [facility] chart review.Today's concern is: pt seen in room at 0830.     Fall, subsequent encounter  Closed fracture of distal end of right radius with routine healing, unspecified fracture morphology, subsequent encounter  Physical deconditioning  New onset type 2 diabetes mellitus (H)  Open wound of buttock, unspecified laterality, subsequent encounter      ALLERGIES: Patient has no known allergies.  Past Medical, Surgical, Family and Social History reviewed and [facility] in [facility].    Current Outpatient Medications   Medication Sig Dispense Refill     ibuprofen (ADVIL/MOTRIN) 400 MG tablet Take 400 mg by mouth every 6 hours as needed for pain       insulin glargine (LANTUS VIAL) 100 UNIT/ML vial Inject 14 Units Subcutaneous every morning From 10/9.       metFORMIN (GLUCOPHAGE) 500 MG tablet Take 500 mg by mouth 2 times daily (with meals)       acetaminophen (TYLENOL) 325 MG tablet Take 650 mg by mouth 4 times daily as needed for mild pain       alcohol swab prep pads Use to swab area of injection/pricila as directed. 100 each 3     aspirin (ASA) 325 MG EC tablet Take 325 mg by mouth every 6 hours as needed for moderate pain       blood glucose (NO BRAND SPECIFIED) test strip Use to test blood sugar 4 times daily or as directed. (Patient taking differently: Use to test blood sugar 4 times daily AND PRN or as directed. CALL IF >250) 100 strip 6     blood glucose calibration (NO BRAND SPECIFIED) solution Use to calibrate blood glucose monitor as needed as directed. 1  "Bottle 3     blood glucose monitoring (NO BRAND SPECIFIED) meter device kit Use to test blood sugar 4 times daily or as directed. 1 kit 0     miconazole (MICATIN) 2 % external powder Apply topically every hour as needed for other (topical candidiasis)       polyethylene glycol (MIRALAX) 17 g packet Take 17 g by mouth daily as needed for constipation MIX with full glass of fluid daily prn       senna-docusate (SENOKOT-S/PERICOLACE) 8.6-50 MG tablet Take 1 tablet by mouth 2 times daily       thin (NO BRAND SPECIFIED) lancets Use with lanceting device.       Medications reviewed:  Medications reconciled to facility chart and changes were made to reflect current medications as identified as above med list. Below are the changes that were made:   Medications stopped since last EPIC medication reconciliation:   There are no discontinued medications.    Medications started since last [facility] medication reconciliation:  No orders of the defined types were placed in this encounter.     TODAY DURING EXAM/ROS:  No CP, SOB, Cough, dizziness, fevers, chills, HA, N/V, dysuria or Bowel Abnormalities. Appetite is varies.  No pain except right wrist but thinks a little better    Physical Exam:  /81   Pulse 82   Temp 96.8  F (36  C)   Resp 20   Ht 1.702 m (5' 7\")   Wt 80.8 kg (178 lb 1.6 oz)   SpO2 96%   BMI 27.89 kg/m     Exam:  GENERAL APPEARANCE:  Alert, in no distress, appears healthy, oriented, cooperative, a little anxious this am  ENT:  Mouth with moist mucous membranes,   poor dentition  EYES:  Conjunctiva and lids normal, poor vision right eye--only sees shadows  RESP:  respiratory effort  of chest normal, lungs clear to auscultation , no respiratory distress  CV:  Auscultation of heart done , regular rate and rhythm, no murmur, rub, or gallop, trace right hand  edema, +2 pedal pulses  ABDOMEN:  normal bowel sounds, soft, nontender.  M/S:   INFANTE except right arm in splint/sling, good CSM to fingers  SKIN:  " Inspection of skin at baseline, except large bruises on bottom and lower back--these have faded significantly since last visit  NEURO:   Cranial nerves 2-12 are normal tested and grossly at patient's baseline  PSYCH:  oriented X 3, normal insight, judgement and memory, affect and mood normal    Recent Labs:      CBC RESULTS:   Recent Labs   Lab Test 10/04/20  0704 10/03/20  1722   WBC 8.7 12.3*   RBC 3.86 4.49   HGB 12.5 14.3   HCT 35.9 41.7   MCV 93 93   MCH 32.4 31.8   MCHC 34.8 34.3   RDW 12.3 12.2    222       Last Basic Metabolic Panel:  Recent Labs   Lab Test 10/04/20  0704 10/03/20  1722    136   POTASSIUM 3.7 3.8   CHLORIDE 106 105   MENDEZ 8.4* 9.0   CO2 25 26   BUN 10 10   CR 0.52 0.56   * 435*          Lab Results   Component Value Date    A1C 13.1 10/04/2020     ASSESSMENT / PLAN:  (W19.XXXD) Fall, subsequent encounter  (primary encounter diagnosis)   (S52.637D) Closed fracture of distal end of right radius with routine healing, unspecified fracture morphology, subsequent encounter  Comment/Plan: going to see Dr Pires today at 1330 for eval. May need surgery--will do preop if needed.    (R53.81) Physical deconditioning  Comment/Plan: PT and OT within Ortho restrictions,     (E11.9) New onset type 2 diabetes mellitus (H)  Comment/Plan: insulin and metformin adjusted by Dr Martinez last week--sugars running mostly low to mid 100's and occas >200.  Cont same- will discontinue sliding scale insulin when able. At this point will need insulin at home    (S31.704D) Open wound of buttock, unspecified laterality, subsequent encounter  Comment/Plan: improved per staff, monitor.      Electronically signed by  FAY Glasgow CNP

## 2020-10-15 ENCOUNTER — OFFICE VISIT (OUTPATIENT)
Dept: FAMILY MEDICINE | Facility: CLINIC | Age: 75
End: 2020-10-15

## 2020-10-15 ENCOUNTER — NURSING HOME VISIT (OUTPATIENT)
Dept: GERIATRICS | Facility: CLINIC | Age: 75
End: 2020-10-15
Payer: COMMERCIAL

## 2020-10-15 VITALS
BODY MASS INDEX: 30.01 KG/M2 | DIASTOLIC BLOOD PRESSURE: 74 MMHG | WEIGHT: 175.8 LBS | TEMPERATURE: 98.1 F | HEIGHT: 64 IN | SYSTOLIC BLOOD PRESSURE: 110 MMHG | OXYGEN SATURATION: 97 % | HEART RATE: 92 BPM

## 2020-10-15 VITALS
DIASTOLIC BLOOD PRESSURE: 81 MMHG | SYSTOLIC BLOOD PRESSURE: 128 MMHG | BODY MASS INDEX: 27.95 KG/M2 | WEIGHT: 178.1 LBS | HEART RATE: 82 BPM | RESPIRATION RATE: 20 BRPM | OXYGEN SATURATION: 96 % | HEIGHT: 67 IN | TEMPERATURE: 96.8 F

## 2020-10-15 DIAGNOSIS — Z71.89 ACP (ADVANCE CARE PLANNING): ICD-10-CM

## 2020-10-15 DIAGNOSIS — S31.809D OPEN WOUND OF BUTTOCK, UNSPECIFIED LATERALITY, SUBSEQUENT ENCOUNTER: ICD-10-CM
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
## 2020-10-15 DIAGNOSIS — S52.501D CLOSED FRACTURE OF DISTAL END OF RIGHT RADIUS WITH ROUTINE HEALING, UNSPECIFIED FRACTURE MORPHOLOGY, SUBSEQUENT ENCOUNTER: ICD-10-CM

## 2020-10-15 DIAGNOSIS — E11.9 NEW ONSET TYPE 2 DIABETES MELLITUS (H): ICD-10-CM

## 2020-10-15 DIAGNOSIS — R53.81 PHYSICAL DECONDITIONING: ICD-10-CM

## 2020-10-15 DIAGNOSIS — Z01.818 PRE-OPERATIVE EXAMINATION: Primary | ICD-10-CM

## 2020-10-15 DIAGNOSIS — M84.433A: ICD-10-CM

## 2020-10-15 DIAGNOSIS — Z76.89 HEALTH CARE HOME: ICD-10-CM

## 2020-10-15 LAB
% GRANULOCYTES: 54.6 %
BUN SERPL-MCNC: 16 MG/DL (ref 7–25)
BUN/CREATININE RATIO: 19.3 (ref 6–22)
CALCIUM SERPL-MCNC: 10.1 MG/DL (ref 8.6–10.3)
CHLORIDE SERPLBLD-SCNC: 103.8 MMOL/L (ref 98–110)
CO2 SERPL-SCNC: 25.3 MMOL/L (ref 20–32)
CREAT SERPL-MCNC: 0.83 MG/DL (ref 0.7–1.18)
GLUCOSE SERPL-MCNC: 183 MG/DL (ref 60–99)
HCT VFR BLD AUTO: 44.2 % (ref 35–47)
HEMOGLOBIN: 14.7 G/DL (ref 11.7–15.7)
LYMPHOCYTES NFR BLD AUTO: 37.6 %
MCH RBC QN AUTO: 33.6 PG (ref 26–33)
MCHC RBC AUTO-ENTMCNC: 33.3 G/DL (ref 31–36)
MCV RBC AUTO: 100.8 FL (ref 78–100)
MONOCYTES NFR BLD AUTO: 7.8 %
PLATELET COUNT - QUEST: 291 10^9/L (ref 150–375)
POTASSIUM SERPL-SCNC: 4.72 MMOL/L (ref 3.5–5.3)
RBC # BLD AUTO: 4.38 10*12/L (ref 3.8–5.2)
SODIUM SERPL-SCNC: 139 MMOL/L (ref 135–146)
WBC # BLD AUTO: 9.3 10*9/L (ref 4–11)

## 2020-10-15 PROCEDURE — 36415 COLL VENOUS BLD VENIPUNCTURE: CPT | Performed by: FAMILY MEDICINE

## 2020-10-15 PROCEDURE — 99204 OFFICE O/P NEW MOD 45 MIN: CPT | Performed by: FAMILY MEDICINE

## 2020-10-15 PROCEDURE — 85025 COMPLETE CBC W/AUTO DIFF WBC: CPT | Performed by: FAMILY MEDICINE

## 2020-10-15 PROCEDURE — 80048 BASIC METABOLIC PNL TOTAL CA: CPT | Performed by: FAMILY MEDICINE

## 2020-10-15 PROCEDURE — 99309 SBSQ NF CARE MODERATE MDM 30: CPT | Performed by: NURSE PRACTITIONER

## 2020-10-15 ASSESSMENT — MIFFLIN-ST. JEOR: SCORE: 1287.42

## 2020-10-15 NOTE — Clinical Note
HI Dr Merrill, I saw this pt as 0830 this am. I was told by pt she was going to Ortho for eval.  It appears you saw her also.  I don't know if your billing wont go thru or not.    Are you going to manage her care remotely from now on in TCU,  and when she comes back after surgery?  Per [facility] policy we cant co-manage.  I am happy to sign off but cannot come back on then.  What are your thoughts?   Kiley Armendariz CNP, [facility]

## 2020-10-15 NOTE — PROGRESS NOTES
[facility]  [address]  [address]  [facility] [address]  [phone]  Dept: [phone]    PRE-OP EVALUATION:  Today's date: 10/15/2020    Isabella Prather (: 1945) presents for pre-operative evaluation assessment as requested by Dr. Cliff Ly.  She requires evaluation and anesthesia risk assessment prior to undergoing surgery/procedure for treatment of Right radial fracture .    Proposed Surgery/ Procedure: ORIK distal radius right  Date of Surgery/ Procedure: 10/16/2020  Time of Surgery/ Procedure:   Hospital/Surgical Facility: [facility]  Surgery Fax Number: [phone]  Primary Physician: Al Merrill  Type of Anesthesia Anticipated: to be determined    Preoperative Questionnaire:   No - Have you ever had a heart attack or stroke?  No - Have you ever had surgery on your heart or blood vessels, such as a stent, coronary (heart) bypass, or surgery on an artery in the head, neck, heart, or legs?  No - Do you have chest pain when you are physically active?  No - Do you have a history of heart failure?  No - Do you currently have a cold, bronchitis, or symptoms of other respiratory (head and chest) infections?  No - Do you have a cough, shortness of breath, or wheezing?  No - Do you or anyone in your family have a history of blood clots?  No - Do you or anyone in your family have a serious bleeding problem, such as long-lasting bleeding after surgeries or cuts?  No - Have you ever had anemia or been told to take iron pills?  No - Have you had any abnormal blood loss such as black, tarry or bloody stools, or abnormal vaginal bleeding?  No - Have you ever had a blood transfusion?  Yes - Are you willing to have a blood transfusion if it is medically needed before, during, or after your surgery?  No - Have you or anyone in your family ever had problems with anesthesia (sedation for surgery)?  No - Do you have sleep apnea, excessive snoring, or  daytime drowsiness?   No - Do you have any artifical heart valves or other implanted medical devices, such as a pacemaker, defibrillator, or continuous glucose monitor?  No - Do you have any artifical joints?  No - Are you allergic to latex?  No - Is there any chance that you may be pregnant?    Patient has a Health Care Directive or Living Will:  NO    HPI:     HPI related to upcoming procedure: right sided radial fracture      DIABETES - Patient has a longstanding history of DiabetesType new onset type 2 diabetes . Patient is being treated with oral agents and insulin injections and denies significant side effects. Control has been poor. Complicating factors include but are not limited to: none.       MEDICAL HISTORY:     Patient Active Problem List    Diagnosis Date Noted     Type I or II open fracture of distal end of right radius with routine healing, unspecified fracture morphology, subsequent encounter: 10/3/20 10/06/2020     Priority: Medium     E-coli UTI 10/06/2020     Priority: Medium     Open wound of buttock, unspecified laterality, subsequent encounter 10/06/2020     Priority: Medium     New onset type 2 diabetes mellitus (H) 10/06/2020     Priority: Medium     Pain due to fracture 10/06/2020     Priority: Medium     Physical deconditioning 10/06/2020     Priority: Medium     Balance problems 10/06/2020     Priority: Medium     Drug-induced constipation 10/06/2020     Priority: Medium     Cataract of right eye, unspecified cataract type 10/06/2020     Priority: Medium     Fall, initial encounter 10/03/2020     Priority: Medium     Closed fracture of distal end of right radius, unspecified fracture morphology, initial encounter 10/03/2020     Priority: Medium     ACP (advance care planning) 10/15/2020     Priority: Low     Health Care Home 10/15/2020     Priority: Low      No past medical history on file.  No past surgical history on file.  Current Outpatient Medications   Medication Sig Dispense Refill      acetaminophen (TYLENOL) 325 MG tablet Take 650 mg by mouth 4 times daily as needed for mild pain       alcohol swab prep pads Use to swab area of injection/pricila as directed. 100 each 3     aspirin (ASA) 325 MG EC tablet Take 325 mg by mouth every 6 hours as needed for moderate pain       blood glucose (NO BRAND SPECIFIED) test strip Use to test blood sugar 4 times daily or as directed. (Patient taking differently: Use to test blood sugar 4 times daily AND PRN or as directed. CALL IF >250) 100 strip 6     blood glucose calibration (NO BRAND SPECIFIED) solution Use to calibrate blood glucose monitor as needed as directed. 1 Bottle 3     blood glucose monitoring (NO BRAND SPECIFIED) meter device kit Use to test blood sugar 4 times daily or as directed. 1 kit 0     ibuprofen (ADVIL/MOTRIN) 400 MG tablet Take 400 mg by mouth every 6 hours as needed for pain       insulin glargine (LANTUS VIAL) 100 UNIT/ML vial Inject 14 Units Subcutaneous every morning From 10/9.       metFORMIN (GLUCOPHAGE) 500 MG tablet Take 500 mg by mouth 2 times daily (with meals)       miconazole (MICATIN) 2 % external powder Apply topically every hour as needed for other (topical candidiasis)       polyethylene glycol (MIRALAX) 17 g packet Take 17 g by mouth daily as needed for constipation MIX with full glass of fluid daily prn       senna-docusate (SENOKOT-S/PERICOLACE) 8.6-50 MG tablet Take 1 tablet by mouth 2 times daily       thin (NO BRAND SPECIFIED) lancets Use with lanceting device.       OTC products: Aspirin    No Known Allergies   Latex Allergy: NO    Social History     Tobacco Use     Smoking status: Former Smoker     Packs/day: 1.00     Years: 20.00     Pack years: 20.00     Smokeless tobacco: Never Used   Substance Use Topics     Alcohol use: Yes     Frequency: 2-4 times a month     Binge frequency: Never     History   Drug Use Unknown       REVIEW OF SYSTEMS:   Constitutional, neuro, ENT, endocrine, pulmonary, cardiac,  "gastrointestinal, genitourinary, musculoskeletal, integument and psychiatric systems are negative, except as otherwise noted.    EXAM:   /74 (BP Location: Left arm, Patient Position: Sitting, Cuff Size: Adult Large)   Pulse 92   Temp 98.1  F (36.7  C) (Oral)   Ht 1.626 m (5' 4\")   Wt 79.7 kg (175 lb 12.8 oz)   LMP  (LMP Unknown)   SpO2 97%   BMI 30.18 kg/m      GENERAL APPEARANCE: healthy, alert and no distress     EYES: EOMI, PERRL     HENT: ear canals and TM's normal and nose and mouth without ulcers or lesions     NECK: no adenopathy, no asymmetry, masses, or scars and thyroid normal to palpation     RESP: lungs clear to auscultation - no rales, rhonchi or wheezes     CV: regular rates and rhythm, normal S1 S2, no S3 or S4 and no murmur, click or rub     ABDOMEN:  soft, nontender, no HSM or masses and bowel sounds normal     MS: extremities normal- no gross deformities noted, no evidence of inflammation in joints, FROM in all extremities.     SKIN: no suspicious lesions or rashes     SKIN: ecchymoses - back and buttocks     NEURO: Normal strength and tone, sensory exam grossly normal, mentation intact and speech normal   unsteady gait pt states has been present last few years     PSYCH: mentation appears normal. and affect normal/bright     LYMPHATICS: No cervical adenopathy    DIAGNOSTICS:   EKG: Normal Sinus Rhythm, normal axis, normal intervals, no acute ST/T changes c/w ischemia, no LVH by voltage criteria    Recent Labs   Lab Test 10/04/20  0704 10/03/20  1722   HGB 12.5 14.3    222   INR  --  1.13    136   POTASSIUM 3.7 3.8   CR 0.52 0.56   A1C 13.1*  --         IMPRESSION:   Reason for surgery/procedure: wrist fracture    The proposed surgical procedure is considered INTERMEDIATE risk.    REVISED CARDIAC RISK INDEX  The patient has the following serious cardiovascular risks for perioperative complications such as (MI, PE, VFib and 3  AV Block):  Diabetes Mellitus (on " Insulin)  INTERPRETATION: 2 risks: Class III (moderate risk - 6.6% complication rate)    The patient has the following additional risks for perioperative complications:  No identified additional risks      ICD-10-CM    1. Pre-operative examination  Z01.818 Basic Metabolic Panel (BFP)     HEMOGRAM PLATELET DIFF (BFP)     CANCELED: EKG 12-lead complete w/read - Clinics   2. ACP (advance care planning)  Z71.89    3. Health Care Home  Z76.89    4. Pathological fracture of right radius, unspecified pathological cause, initial encounter  M84.433A    5. New onset type 2 diabetes mellitus (H)  E11.9 Basic Metabolic Panel (BFP)     6. Unsteady gait unknown reason no recent head trauma  CANCELED: EKG 12-lead complete w/read - Clinics     Recent Results (from the past 24 hour(s))   HEMOGRAM PLATELET DIFF (BFP)    Collection Time: 10/15/20  3:53 PM   Result Value Ref Range    WBC 9.3 4.0 - 11 10*9/L    RBC Count 4.38 3.8 - 5.2 10*12/L    Hemoglobin 14.7 11.7 - 15.7 g/dL    Hematocrit 44.2 35.0 - 47.0 %    .8 (A) 78 - 100 fL    MCH 33.6 (A) 26 - 33 pg    MCHC 33.3 31 - 36 g/dL    Platelet Count 291 150 - 375 10^9/L    % Granulocytes 54.6 %    % Lymphocytes 37.6 %    % Monocytes 7.8 %         RECOMMENDATIONS:           Diabetes Medication Use  Take metformin the day before surgery  -----Take 80% of long acting insulin (e.g. Lantus, NPH) while NPO (fasting)      APPROVAL GIVEN to proceed with proposed procedure, without further diagnostic evaluation       Signed Electronically by: Al Merrill MD    Copy of this evaluation report is provided to requesting physician.    [place] Preop Guidelines    Revised Cardiac Risk Index

## 2020-10-16 ENCOUNTER — TELEPHONE (OUTPATIENT)
Dept: FAMILY MEDICINE | Facility: CLINIC | Age: 75
End: 2020-10-16

## 2020-10-19 ENCOUNTER — TRANSFERRED RECORDS (OUTPATIENT)
Dept: HEALTH INFORMATION MANAGEMENT | Facility: CLINIC | Age: 75
End: 2020-10-19

## 2020-10-19 ENCOUNTER — DOCUMENTATION ONLY (OUTPATIENT)
Dept: OTHER | Facility: CLINIC | Age: 75
End: 2020-10-19

## 2020-10-19 PROBLEM — Z71.89 ACP (ADVANCE CARE PLANNING): Status: RESOLVED | Noted: 2020-10-15 | Resolved: 2020-10-19

## 2020-10-19 ASSESSMENT — MIFFLIN-ST. JEOR: SCORE: 1329.61

## 2020-10-19 NOTE — PROGRESS NOTES
"[place] [facility]    Chief Complaint   Patient presents with     Nursing Home Acute:: Surgerical repair of right wrist fracture on 10/19/2020       [place] Medical Record Number:  [id number]  Place of Service where encounter took place:  [facility] [[id number]]    HPI:    Isabella Prather is a 75 year old  (1945), who is being seen today for an episodic care visit.  HPI information obtained from: facility chart records, facility staff, patient report and [facility] chart review.Today's concern is: pt seen in room at 0845--got back yest from Right wrist repair. Said has \"a lot of pain\" but otherwise feels ok. Feels spirits are good.     Fall, subsequent encounter  Closed fracture of distal end of right radius with routine healing, unspecified fracture morphology, subsequent encounter  Acute post-operative pain  Status post wrist surgery  Physical deconditioning  New onset type 2 diabetes mellitus (H)  Drug-induced constipation      ALLERGIES: Patient has no known allergies.  Past Medical, Surgical, Family and Social History reviewed and updated in [facility].    Current Outpatient Medications   Medication Sig Dispense Refill     oxyCODONE (ROXICODONE) 5 MG tablet Take 5 mg by mouth 2 times daily Am and HS with motrin at hs give with food/milk. For pain       oxyCODONE (ROXICODONE) 5 MG tablet Take 5 mg by mouth every 4 hours as needed for severe pain       alcohol swab prep pads Use to swab area of injection/pricila as directed. 100 each 3     aspirin (ASA) 325 MG EC tablet Take 325 mg by mouth every 6 hours as needed for moderate pain       blood glucose (NO BRAND SPECIFIED) test strip Use to test blood sugar 4 times daily or as directed. (Patient taking differently: Use to test blood sugar 4 times daily AND PRN or as directed. CALL IF >250) 100 strip 6     ibuprofen (ADVIL/MOTRIN) 400 MG tablet Take 400 mg by mouth every 6 hours as needed for pain And 400mg scheduled " "with the am and pm oxycodone.       insulin glargine (LANTUS VIAL) 100 UNIT/ML vial Inject 14 Units Subcutaneous every morning From 10/9.   TO CLARIFY NO INSULIN OR METFORMIN DAY OF SURGERY. GIVE ALL MEDS as written the day before; Use sliding scale insulin after surgery on Monday. Can give Metformin with dinner on 10/19.Monday.   Resume Lantus and Metformin as written on 10/20 Tuesday.       metFORMIN (GLUCOPHAGE) 500 MG tablet Take 500 mg by mouth 2 times daily (with meals) TO CLARIFY NO INSULIN OR METFORMIN DAY OF SURGERY. GIVE ALL MEDS as written the day before; Use sliding scale insulin after surgery on Monday. Can give Metformin with dinner on 10/19.Monday.   Resume Lantus and Metformin as written on 10/20 Tuesday.       miconazole (MICATIN) 2 % external powder Apply topically every hour as needed for other (topical candidiasis)       polyethylene glycol (MIRALAX) 17 g packet Take 17 g by mouth daily as needed for constipation MIX with full glass of fluid daily prn       senna-docusate (SENOKOT-S/PERICOLACE) 8.6-50 MG tablet Take 1 tablet by mouth 2 times daily       thin (NO BRAND SPECIFIED) lancets Use with lanceting device.       Medications reviewed:  Medications reconciled to facility chart and changes were made to reflect current medications as identified as above med list. Below are the changes that were made:   Medications stopped since last EPIC medication reconciliation:   There are no discontinued medications.    Medications started since last [facility] medication reconciliation:  No orders of the defined types were placed in this encounter.    TODAY DURING EXAM/ROS:  No CP, SOB, Cough, dizziness, fevers, chills, HA, N/V, dysuria or Bowel Abnormalities. Appetite improving.  No pain except right wrist and see above.    Physical Exam:  /65   Pulse 69   Temp 97.6  F (36.4  C)   Resp 18   Ht 1.702 m (5' 7\")   Wt 79.2 kg (174 lb 9.6 oz)   LMP  (LMP Unknown)   SpO2 99%   BMI 27.35 kg/m   "   Exam:  GENERAL APPEARANCE:  Alert, in no distress, appears healthy, oriented, cooperative, smiling.  ENT:  Mouth with moist mucous membranes,   poor dentition  EYES:  Conjunctiva and lids normal, poor vision right eye.  RESP:  respiratory effort  of chest normal, lungs clear to auscultation , non distressed.  CV:  Auscultation of heart done , RRR, no murmur, rub, or gallop, trace right hand  edema, +2 pedal pulses  ABDOMEN:  normal bowel sounds, soft, nontender.  M/S:   INFANTE except right arm in splint/sling, good CSM to fingers, can wiggle  SKIN:  Inspection of skin at baseline, except bruising on bottom and lower back cont to fade   NEURO:   Cranial nerves 2-12 are  grossly intact and at patient's baseline  PSYCH:  oriented X 3, normal insight, judgement and memory, affect and mood normal    Recent Labs:   Recent Labs   Lab Test 10/15/20  1646 10/04/20  0704 10/03/20  1722   .0 137 136   POTASSIUM 4.72 3.7 3.8   CHLORIDE 103.8 106 105   CO2 25.3 25 26   ANIONGAP  --  6 5   * 291* 435*   BUN 16  19.3 10 10   CR 0.83 0.52 0.56   MENDEZ 10.1 8.4* 9.0     CBC RESULTS:   Recent Labs   Lab Test 10/15/20  1553 10/04/20  0704   WBC 9.3 8.7   RBC 4.38 3.86   HGB 14.7 12.5   HCT 44.2 35.9   .8* 93   MCH 33.6* 32.4   MCHC 33.3 34.8   RDW  --  12.3    191          Lab Results   Component Value Date    A1C 13.1 10/04/2020     ASSESSMENT / PLAN:  (W19.XXXD) Fall, subsequent encounter  (primary encounter diagnosis)   (S52.501D) Closed fracture of distal end of right radius with routine healing, unspecified fracture morphology, subsequent encounter   (G89.18) Acute post-operative pain   (Z98.890) Status post wrist surgery: Right ORIF on 10/19/20  Comment/Plan: had surgery yest, non wt bearing right wrist, f/u ortho per their recs. No removal of drsg    (R53.81) Physical deconditioning  Comment/Plan: PT OT within Ortho orders of [facility], monitor.    (E11.9) New onset type 2 diabetes mellitus (H)  Comment/Plan:  full resumption of DM meds today.. sugars had been mostly in mid 100's but now variable up to mid 200's--should correct now--will adjust as needed.    (K59.03) Drug-induced constipation  Comment/Plan: senna S and prn Miralax         Electronically signed by  FAY Glasgow CNP

## 2020-10-20 ENCOUNTER — NURSING HOME VISIT (OUTPATIENT)
Dept: GERIATRICS | Facility: CLINIC | Age: 75
End: 2020-10-20
Payer: COMMERCIAL

## 2020-10-20 VITALS
WEIGHT: 174.6 LBS | BODY MASS INDEX: 27.4 KG/M2 | HEIGHT: 67 IN | OXYGEN SATURATION: 99 % | DIASTOLIC BLOOD PRESSURE: 65 MMHG | HEART RATE: 69 BPM | SYSTOLIC BLOOD PRESSURE: 123 MMHG | RESPIRATION RATE: 18 BRPM | TEMPERATURE: 97.6 F
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
## 2020-10-20 DIAGNOSIS — K59.03 DRUG-INDUCED CONSTIPATION: ICD-10-CM

## 2020-10-20 DIAGNOSIS — E11.9 NEW ONSET TYPE 2 DIABETES MELLITUS (H): ICD-10-CM

## 2020-10-20 DIAGNOSIS — Z98.890 STATUS POST WRIST SURGERY: ICD-10-CM

## 2020-10-20 DIAGNOSIS — S52.501D CLOSED FRACTURE OF DISTAL END OF RIGHT RADIUS WITH ROUTINE HEALING, UNSPECIFIED FRACTURE MORPHOLOGY, SUBSEQUENT ENCOUNTER: ICD-10-CM

## 2020-10-20 DIAGNOSIS — G89.18 ACUTE POST-OPERATIVE PAIN: ICD-10-CM

## 2020-10-20 DIAGNOSIS — R53.81 PHYSICAL DECONDITIONING: ICD-10-CM

## 2020-10-20 PROCEDURE — 99309 SBSQ NF CARE MODERATE MDM 30: CPT | Performed by: NURSE PRACTITIONER

## 2020-10-20 RX ORDER — OXYCODONE HYDROCHLORIDE 5 MG/1
5 TABLET ORAL EVERY MORNING
COMMUNITY
Start: 2020-10-26 | End: 2020-10-28

## 2020-10-20 RX ORDER — OXYCODONE HYDROCHLORIDE 5 MG/1
5 TABLET ORAL EVERY 4 HOURS PRN
COMMUNITY
End: 2020-11-05

## 2020-10-21 ASSESSMENT — MIFFLIN-ST. JEOR: SCORE: 1330.52

## 2020-10-22 ENCOUNTER — NURSING HOME VISIT (OUTPATIENT)
Dept: GERIATRICS | Facility: CLINIC | Age: 75
End: 2020-10-22
Payer: COMMERCIAL

## 2020-10-22 ENCOUNTER — RECORDS - HEALTHEAST (OUTPATIENT)
Dept: LAB | Facility: CLINIC | Age: 75
End: 2020-10-22

## 2020-10-22 VITALS
WEIGHT: 174.8 LBS | BODY MASS INDEX: 27.44 KG/M2 | SYSTOLIC BLOOD PRESSURE: 117 MMHG | DIASTOLIC BLOOD PRESSURE: 60 MMHG | TEMPERATURE: 98 F | RESPIRATION RATE: 18 BRPM | OXYGEN SATURATION: 96 % | HEIGHT: 67 IN | HEART RATE: 78 BPM

## 2020-10-22 DIAGNOSIS — Z98.890 STATUS POST WRIST SURGERY: ICD-10-CM

## 2020-10-22 DIAGNOSIS — R53.81 PHYSICAL DECONDITIONING: ICD-10-CM

## 2020-10-22 DIAGNOSIS — S52.501D CLOSED FRACTURE OF DISTAL END OF RIGHT RADIUS WITH ROUTINE HEALING, UNSPECIFIED FRACTURE MORPHOLOGY, SUBSEQUENT ENCOUNTER: ICD-10-CM

## 2020-10-22 DIAGNOSIS — K59.03 DRUG-INDUCED CONSTIPATION: ICD-10-CM
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
## 2020-10-22 DIAGNOSIS — G89.18 ACUTE POST-OPERATIVE PAIN: ICD-10-CM

## 2020-10-22 DIAGNOSIS — E11.9 NEW ONSET TYPE 2 DIABETES MELLITUS (H): ICD-10-CM

## 2020-10-22 PROCEDURE — 99309 SBSQ NF CARE MODERATE MDM 30: CPT | Performed by: NURSE PRACTITIONER

## 2020-10-22 NOTE — PROGRESS NOTES
[place] [facility]    Chief Complaint   Patient presents with     Nursing Home Acute:: Surgerical repair of right wrist fracture on 10/19/2020       [place] Medical Record Number:  [id number]  Place of Service where encounter took place:  [facility] [[id number]]    HPI:    Isabella Prather is a 75 year old  (1945), who is being seen today for an episodic care visit.  HPI information obtained from: facility chart records, facility staff, patient report and [facility] chart review.Today's concern is: pt feels better and much less pain today.   Still feels a little constipated.     Fall, subsequent encounter  Closed fracture of distal end of right radius with routine healing, unspecified fracture morphology, subsequent encounter  Acute post-operative pain  Status post wrist surgery  Physical deconditioning  New onset type 2 diabetes mellitus (H)  Drug-induced constipation      ALLERGIES: Patient has no known allergies.  Past Medical, Surgical, Family and Social History reviewed and updated in [facility].    Current Outpatient Medications   Medication Sig Dispense Refill     alcohol swab prep pads Use to swab area of injection/pricila as directed. 100 each 3     aspirin (ASA) 325 MG EC tablet Take 325 mg by mouth every 6 hours as needed for moderate pain       blood glucose (NO BRAND SPECIFIED) test strip Use to test blood sugar 4 times daily or as directed. (Patient taking differently: Use to test blood sugar 4 times daily AND PRN or as directed. CALL IF >250) 100 strip 6     ibuprofen (ADVIL/MOTRIN) 400 MG tablet Take 400 mg by mouth every 6 hours as needed for pain And 400mg scheduled with the am and pm oxycodone.       insulin glargine (LANTUS VIAL) 100 UNIT/ML vial Inject 14 Units Subcutaneous every morning From 10/9.   TO CLARIFY NO INSULIN OR METFORMIN DAY OF SURGERY. GIVE ALL MEDS as written the day before; Use sliding scale insulin after surgery on Monday. Can  "give Metformin with dinner on 10/19.Monday.   Resume Lantus and Metformin as written on 10/20 Tuesday.       metFORMIN (GLUCOPHAGE) 500 MG tablet Take 500 mg by mouth 2 times daily (with meals) TO CLARIFY NO INSULIN OR METFORMIN DAY OF SURGERY. GIVE ALL MEDS as written the day before; Use sliding scale insulin after surgery on Monday. Can give Metformin with dinner on 10/19.Monday.   Resume Lantus and Metformin as written on 10/20 Tuesday.       miconazole (MICATIN) 2 % external powder Apply topically every hour as needed for other (topical candidiasis)       oxyCODONE (ROXICODONE) 5 MG tablet Take 5 mg by mouth 2 times daily Am and HS with motrin at hs give with food/milk. For pain       oxyCODONE (ROXICODONE) 5 MG tablet Take 5 mg by mouth every 4 hours as needed for severe pain       polyethylene glycol (MIRALAX) 17 g packet Take 17 g by mouth daily as needed for constipation MIX with full glass of fluid daily prn       senna-docusate (SENOKOT-S/PERICOLACE) 8.6-50 MG tablet Take 1 tablet by mouth 2 times daily       thin (NO BRAND SPECIFIED) lancets Use with lanceting device.       Medications reviewed:  Medications reconciled to facility chart and changes were made to reflect current medications as identified as above med list. Below are the changes that were made:   Medications stopped since last EPIC medication reconciliation:   There are no discontinued medications.    Medications started since last [facility] medication reconciliation:  No orders of the defined types were placed in this encounter.    TODAY DURING EXAM/ROS:  No CP, SOB, Cough, dizziness, fevers, chills, HA, N/V, dysuria. Feels a little constipated. Appetite better.  No pain except right wrist but better    Physical Exam:  /60   Pulse 78   Temp 98  F (36.7  C)   Resp 18   Ht 1.702 m (5' 7\")   Wt 79.3 kg (174 lb 12.8 oz)   LMP  (LMP Unknown)   SpO2 96%   BMI 27.38 kg/m     Exam:  GENERAL APPEARANCE:  Alert, in no distress, appears " healthy, oriented, cooperative, smiling.  ENT:  Mouth with moist mucous membranes,   poor dentition  RESP:  respiratory effort  of chest normal, lungs clear to auscultation.  CV:  Auscultation of heart done , RRR, no murmur, rub, or gallop, edema, +2 pedal pulses  ABDOMEN:  normal bowel sounds, soft, nontender.  M/S:   INFANTE except right arm in splint/sling, good CSM to fingers, can wiggle more than on 10/20.  SKIN:  Inspection of skin at baseline  NEURO:   Cranial nerves 2-12 are  grossly intact and at patient's baseline  PSYCH:  oriented X 3, normal insight, judgement and memory, affect and mood normal    Recent Labs:   Recent Labs   Lab Test 10/15/20  1646 10/04/20  0704 10/03/20  1722   .0 137 136   POTASSIUM 4.72 3.7 3.8   CHLORIDE 103.8 106 105   CO2 25.3 25 26   ANIONGAP  --  6 5   * 291* 435*   BUN 16  19.3 10 10   CR 0.83 0.52 0.56   MENDEZ 10.1 8.4* 9.0     CBC RESULTS:   Recent Labs   Lab Test 10/15/20  1553 10/04/20  0704   WBC 9.3 8.7   RBC 4.38 3.86   HGB 14.7 12.5   HCT 44.2 35.9   .8* 93   MCH 33.6* 32.4   MCHC 33.3 34.8   RDW  --  12.3    191          Lab Results   Component Value Date    A1C 13.1 10/04/2020     ASSESSMENT / PLAN:  (W19.XXXD) Fall, subsequent encounter  (primary encounter diagnosis)   (S52.501D) Closed fracture of distal end of right radius with routine healing, unspecified fracture morphology, subsequent encounter   (G89.18) Acute post-operative pain   (Z98.890) Status post wrist surgery: Right ORIF on 10/19/20  Comment/Plan: Non wt bearing right wrist, f/u ortho per their recs. No removal of drsg. Pain is better, cont same POC< monitor.    (R53.81) Physical deconditioning  Comment/Plan: PT OT within [facility] orders, monitor.    (E11.9) New onset type 2 diabetes mellitus (H)  Comment/Plan: sugars had been mostly low to mid 100's, though occas up to mid 200's--will consider decrease of insulin if able as on metformin bid. Monitor.    (K59.03) Drug-induced  constipation  Comment/Plan: give Miralax today, cont with senna S and prn Miralax         Electronically signed by  FAY Glasgow CNP

## 2020-10-23 ENCOUNTER — TRANSFERRED RECORDS (OUTPATIENT)
Dept: HEALTH INFORMATION MANAGEMENT | Facility: CLINIC | Age: 75
End: 2020-10-23

## 2020-10-23 LAB
ANION GAP SERPL CALCULATED.3IONS-SCNC: 5 MMOL/L (ref 5–18)
ANION GAP SERPL CALCULATED.3IONS-SCNC: 5 MMOL/L (ref 5–18)
BUN SERPL-MCNC: 12 MG/DL (ref 8–28)
BUN SERPL-MCNC: 12 MG/DL (ref 8–28)
CALCIUM SERPL-MCNC: 9.5 MG/DL (ref 8.5–10.5)
CALCIUM SERPL-MCNC: 9.5 MG/DL (ref 8.5–10.5)
CHLORIDE BLD-SCNC: 105 MMOL/L (ref 98–107)
CHLORIDE SERPLBLD-SCNC: 105 MMOL/L (ref 98–107)
CO2 SERPL-SCNC: 30 MMOL/L (ref 22–31)
CO2 SERPL-SCNC: 30 MMOL/L (ref 22–31)
CREAT SERPL-MCNC: 0.8 MG/DL (ref 0.6–1.1)
CREAT SERPL-MCNC: 0.8 MG/DL (ref 0.6–1.1)
GFR SERPL CREATININE-BSD FRML MDRD: >60 ML/MIN/1.73M2
GFR SERPL CREATININE-BSD FRML MDRD: >60 ML/MIN/1.73M2
GLUCOSE BLD-MCNC: 191 MG/DL (ref 70–125)
GLUCOSE SERPL-MCNC: 191 MG/DL (ref 70–125)
HEMOGLOBIN: 12.3 G/DL (ref 12–16)
HGB BLD-MCNC: 12.3 G/DL (ref 12–16)
POTASSIUM BLD-SCNC: 3.8 MMOL/L (ref 3.5–5)
POTASSIUM SERPL-SCNC: 3.8 MMOL/L (ref 3.5–5)
SODIUM SERPL-SCNC: 140 MMOL/L (ref 136–145)
SODIUM SERPL-SCNC: 140 MMOL/L (ref 136–145)

## 2020-10-25 ASSESSMENT — MIFFLIN-ST. JEOR: SCORE: 1336.41

## 2020-10-26 ENCOUNTER — NURSING HOME VISIT (OUTPATIENT)
Dept: GERIATRICS | Facility: CLINIC | Age: 75
End: 2020-10-26
Payer: COMMERCIAL

## 2020-10-26 VITALS
DIASTOLIC BLOOD PRESSURE: 83 MMHG | RESPIRATION RATE: 17 BRPM | WEIGHT: 176.1 LBS | HEIGHT: 67 IN | BODY MASS INDEX: 27.64 KG/M2 | TEMPERATURE: 98.6 F | SYSTOLIC BLOOD PRESSURE: 125 MMHG | HEART RATE: 63 BPM | OXYGEN SATURATION: 96 %

## 2020-10-26 DIAGNOSIS — K59.03 DRUG-INDUCED CONSTIPATION: ICD-10-CM

## 2020-10-26 DIAGNOSIS — R60.9 SOFT TISSUE SWELLING OF BACK: ICD-10-CM

## 2020-10-26 DIAGNOSIS — E11.9 NEW ONSET TYPE 2 DIABETES MELLITUS (H): ICD-10-CM

## 2020-10-26 DIAGNOSIS — Z98.890 STATUS POST WRIST SURGERY: ICD-10-CM

## 2020-10-26 DIAGNOSIS — R53.81 PHYSICAL DECONDITIONING: ICD-10-CM
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
## 2020-10-26 DIAGNOSIS — G89.18 ACUTE POST-OPERATIVE PAIN: ICD-10-CM

## 2020-10-26 DIAGNOSIS — S52.501D CLOSED FRACTURE OF DISTAL END OF RIGHT RADIUS WITH ROUTINE HEALING, UNSPECIFIED FRACTURE MORPHOLOGY, SUBSEQUENT ENCOUNTER: ICD-10-CM

## 2020-10-26 PROBLEM — D17.30 LIPOMA OF SKIN AND SUBCUTANEOUS TISSUE: Status: ACTIVE | Noted: 2020-10-26

## 2020-10-26 PROCEDURE — 99309 SBSQ NF CARE MODERATE MDM 30: CPT | Performed by: NURSE PRACTITIONER

## 2020-10-27 NOTE — PROGRESS NOTES
"[facility]    Chief Complaint   Patient presents with     Nursing Home Acute:: Surgical repair of right wrist fracture on 10/19/2020       [place] Medical Record Number:  [id number]  Place of Service where encounter took place:  [facility] ([facility]) [[id number]]    HPI:    Isabella Prather is a 75 year old  (1945), who is being seen today for an episodic care visit.  HPI information obtained from: facility chart records, facility staff, patient report and [facility] chart review.Today's concern is: pt feels better and pain mostly 1-2/10. She still feels a little constipated. Said has an area of swelling in lower back that is not bothering her but \"its there and what is it\"?     Fall, subsequent encounter  Closed fracture of distal end of right radius with routine healing, unspecified fracture morphology, subsequent encounter  Status post wrist surgery  Acute post-operative pain  Physical deconditioning  New onset type 2 diabetes mellitus (H)  Drug-induced constipation  Soft tissue swelling of back      ALLERGIES: Patient has no known allergies.  Past Medical, Surgical, Family and Social History reviewed and updated in [facility].    Current Outpatient Medications   Medication Sig Dispense Refill     pyrithione zinc (SELSUN BLUE/HEAD AND SHOULDERS) 1 % external shampoo Apply topically twice a week Also taking daily prn       alcohol swab prep pads Use to swab area of injection/pricila as directed. 100 each 3     aspirin (ASA) 325 MG EC tablet Take 325 mg by mouth every 6 hours as needed for moderate pain       blood glucose (NO BRAND SPECIFIED) test strip Use to test blood sugar 4 times daily or as directed. (Patient taking differently: Use to test blood sugar 4 times daily AND PRN or as directed. CALL IF >250) 100 strip 6     ibuprofen (ADVIL/MOTRIN) 400 MG tablet Take 400 mg by mouth every 6 hours as needed for pain And 400mg scheduled with the am and pm oxycodone.   " "    insulin glargine (LANTUS VIAL) 100 UNIT/ML vial Inject 12 Units Subcutaneous every morning Decreased to 12 units on 10/26/20:  Call if accuchecks >90 before giving please.       metFORMIN (GLUCOPHAGE) 500 MG tablet Take 500 mg by mouth 2 times daily (with meals) TO CLARIFY NO INSULIN OR METFORMIN DAY OF SURGERY. GIVE ALL MEDS as written the day before; Use sliding scale insulin after surgery on Monday. Can give Metformin with dinner on 10/19.Monday.   Resume Lantus and Metformin as written on 10/20 Tuesday.       miconazole (MICATIN) 2 % external powder Apply topically every hour as needed for other (topical candidiasis)       oxyCODONE (ROXICODONE) 5 MG tablet Take 5 mg by mouth every morning Discontinue after 10/28. give with food/milk. For pain       oxyCODONE (ROXICODONE) 5 MG tablet Take 5 mg by mouth every 4 hours as needed for severe pain       polyethylene glycol (MIRALAX) 17 g packet Take 8.5 g by mouth daily as needed for constipation MIX with full glass of fluid daily prn        senna-docusate (SENOKOT-S/PERICOLACE) 8.6-50 MG tablet Take 1 tablet by mouth 2 times daily       thin (NO BRAND SPECIFIED) lancets Use with lanceting device.        TODAY DURING EXAM/ROS:  No CP, SOB, Cough, dizziness, fevers, chills, HA, N/V, dysuria. +constipated. Appetite better.  Pain in right wrist but better--see above    Physical Exam:  /83   Pulse 63   Temp 98.6  F (37  C)   Resp 17   Ht 1.702 m (5' 7\")   Wt 79.9 kg (176 lb 1.6 oz)   LMP  (LMP Unknown)   SpO2 96%   BMI 27.58 kg/m     Exam:  GENERAL APPEARANCE:  Alert, in no distress, appears healthy, oriented, cooperative.  ENT:  Mouth with moist mucous membranes  RESP:  respiratory effort  of chest normal, lungs clear to auscultation. NAD   CV:  Auscultation of heart done , RRR, no murmur, rub, or gallop, edema, +2 pedal pulses  ABDOMEN:  normal bowel sounds, soft, nontender.  M/S:   INFANTE except right arm in splint/sling, good CSM to fingers, can wiggle " easily.  SKIN:  Inspection of skin at baseline and has area in lower back of a lipoma appearing raised area 6 in chest by 10 inches--no bruising or pain.  NEURO:   Cranial nerves 2-12 are  grossly intact and at patient's baseline  PSYCH:  oriented X 3, normal insight, judgement and memory, affect and mood normal    Recent Labs:   Recent Labs   Lab Test 10/23/20 10/15/20  1646 10/04/20  0704    139.0 137   POTASSIUM 3.8 4.72 3.7   CHLORIDE 105 103.8 106   CO2 30 25.3 25   ANIONGAP 5  --  6   * 183* 291*   BUN 12 16  19.3 10   CR 0.80 0.83 0.52   MENDEZ 9.5 10.1 8.4*     CBC RESULTS:   Recent Labs   Lab Test 10/23/20 10/15/20  1553 10/04/20  0704   WBC  --  9.3 8.7   RBC  --  4.38 3.86   HGB 12.3 14.7 12.5   HCT  --  44.2 35.9   MCV  --  100.8* 93   MCH  --  33.6* 32.4   MCHC  --  33.3 34.8   RDW  --   --  12.3   PLT  --  291 191        Lab Results   Component Value Date    A1C 13.1 10/04/2020     ASSESSMENT / PLAN:  (W19.XXXD) Fall, subsequent encounter  (primary encounter diagnosis)  (S52.501D) Closed fracture of distal end of right radius with routine healing, unspecified fracture morphology, subsequent encounter  (Z98.890) Status post wrist surgery: Right ORIF on 10/19/20  (G89.18) Acute post-operative pain  Comment/Plan: Non wt bearing right wrist, f/u ortho on 10/29. No removal of drsg. Pain is better--stop scheduled oxy, keep prn,  monitor.    (R53.81) Physical deconditioning  Comment/Plan: PT OT within [facility] orders, monitor.    (E11.9) New onset type 2 diabetes mellitus (H)  Comment/Plan: sugars had been mostly low to mid 100's, though occas up to mid 200's--  decrease Lantus to 12 units, no change in metformin bid. Monitor.    (K59.03) Drug-induced constipation  Comment/Plan: Cont with senna S and prn Miralax, decreased oxy may help.       (R60.9) Soft tissue swelling of back  Comment/Plan:  ? Lipoma versus edema area from fall--no pain or bruising, will monitor, can f/u with per PCP after discharge  for eval.      Electronically signed by  FAY Glasgow CNP

## 2020-10-28 ASSESSMENT — MIFFLIN-ST. JEOR: SCORE: 1330.52

## 2020-10-28 NOTE — PROGRESS NOTES
[place]    Chief Complaint   Patient presents with     Nursing Home Acute:: Surgical repair of right wrist fracture on 10/19/2020       [facility] Record Number:  [id number]  Place of Service where encounter took place:  [facility] ([facility]) [[id number]]    HPI:    Isabella Prather is a 75 year old  (1945), who is being seen today for an episodic care visit.  HPI information obtained from: facility chart records, facility staff, patient report and [facility] chart review.Today's concern is: pt feels good, no pain in wrist-- no further constipation. Goes to see Ortho today. Sugars have been more in the low 100's and some 90's most of this week--once 77.     Fall, subsequent encounter  Closed fracture of distal end of right radius with routine healing, unspecified fracture morphology, subsequent encounter  Status post wrist surgery  Acute post-operative pain  Physical deconditioning  New onset type 2 diabetes mellitus (H)  Drug-induced constipation      ALLERGIES: Patient has no known allergies.  Past Medical, Surgical, Family and Social History reviewed and updated in [facility].    Current Outpatient Medications   Medication Sig Dispense Refill     alcohol swab prep pads Use to swab area of injection/pricila as directed. 100 each 3     aspirin (ASA) 325 MG EC tablet Take 325 mg by mouth every 6 hours as needed for moderate pain       blood glucose (NO BRAND SPECIFIED) test strip Use to test blood sugar 4 times daily or as directed. (Patient taking differently: Use to test blood sugar 4 times daily AND PRN or as directed. CALL IF >250) 100 strip 6     ibuprofen (ADVIL/MOTRIN) 400 MG tablet Take 400 mg by mouth every 6 hours as needed for pain And 400mg scheduled with the am and pm oxycodone.       insulin glargine (LANTUS VIAL) 100 UNIT/ML vial Inject 6 Units Subcutaneous every morning Decreased to 12 units on 10/26/20: and decreased to 6 units starting on  "10/30  Call if accuchecks >90 before giving please.       metFORMIN (GLUCOPHAGE) 500 MG tablet Take 500 mg by mouth 2 times daily (with meals) TO CLARIFY NO INSULIN OR METFORMIN DAY OF SURGERY. GIVE ALL MEDS as written the day before; Use sliding scale insulin after surgery on Monday. Can give Metformin with dinner on 10/19.Monday.   Resume Lantus and Metformin as written on 10/20 Tuesday.       miconazole (MICATIN) 2 % external powder Apply topically every hour as needed for other (topical candidiasis)       oxyCODONE (ROXICODONE) 5 MG tablet Take 5 mg by mouth every 4 hours as needed for severe pain       polyethylene glycol (MIRALAX) 17 g packet Take 8.5 g by mouth daily as needed for constipation MIX with full glass of fluid daily prn        pyrithione zinc (SELSUN BLUE/HEAD AND SHOULDERS) 1 % external shampoo Apply topically twice a week Also taking daily prn       senna-docusate (SENOKOT-S/PERICOLACE) 8.6-50 MG tablet Take 1 tablet by mouth 2 times daily       thin (NO BRAND SPECIFIED) lancets Use with lanceting device.        TODAY DURING EXAM/ROS:  No CP, SOB, Cough, dizziness, fevers, chills, HA, N/V, dysuria. +BM.  Appetite better.  Pain in right wrist as noted above    Physical Exam:  /78   Pulse 74   Temp 97.5  F (36.4  C)   Resp 20   Ht 1.702 m (5' 7\")   Wt 79.3 kg (174 lb 12.8 oz)   LMP  (LMP Unknown)   SpO2 96%   BMI 27.38 kg/m     Exam:  GENERAL APPEARANCE:  Alert, in no distress, appears healthy, oriented, cooperative.  ENT:  Mouth with moist mucous membranes  RESP:  respiratory effort  of chest normal, lungs CTA. NAD   CV:  Auscultation of heart done , RRR, no murmur, rub, or gallop, edema, +2 pedal pulses  ABDOMEN:  normal bowel sounds, soft, nontender.  M/S:   INFANTE except right arm in splint/sling, good CSM to fingers, easily moves  SKIN:  Inspection of skin at baseline but limited as pt dressed.  NEURO:   Cranial nerves 2-12 are  grossly intact and at patient's baseline  PSYCH:  " oriented X 3, normal insight, judgement and memory, affect and mood normal    Recent Labs:   Recent Labs   Lab Test 10/23/20 10/15/20  1646 10/04/20  0704    139.0 137   POTASSIUM 3.8 4.72 3.7   CHLORIDE 105 103.8 106   CO2 30 25.3 25   ANIONGAP 5  --  6   * 183* 291*   BUN 12 16  19.3 10   CR 0.80 0.83 0.52   MENDEZ 9.5 10.1 8.4*     CBC RESULTS:   Recent Labs   Lab Test 10/23/20 10/15/20  1553 10/04/20  0704   WBC  --  9.3 8.7   RBC  --  4.38 3.86   HGB 12.3 14.7 12.5   HCT  --  44.2 35.9   MCV  --  100.8* 93   MCH  --  33.6* 32.4   MCHC  --  33.3 34.8   RDW  --   --  12.3   PLT  --  291 191        Lab Results   Component Value Date    A1C 13.1 10/04/2020     ASSESSMENT / PLAN:  (W19.XXXD) Fall, subsequent encounter  (primary encounter diagnosis)  (S52.501D) Closed fracture of distal end of right radius with routine healing, unspecified fracture morphology, subsequent encounter  (Z98.890) Status post wrist surgery: Right ORIF on 10/19/20  (G89.18) Acute post-operative pain  Comment/Plan: Non wt bearing right wrist, f/u ortho today. No removal of drsg. Pain controlled, keep for now the oxy  prn,  monitor.    (R53.81) Physical deconditioning  Comment/Plan: PT OT within [facility] orders, monitor.    (E11.9) New onset type 2 diabetes mellitus (H)  Comment/Plan: sugars have been dropping;ing lower--will decrease the Lantus to 6 units q am, cont metformin bid. Monitor.    (K59.03) Drug-induced constipation  Comment/Plan: controlled now. Will Cont with senna S and prn Miralax, monitor          Electronically signed by  FAY Glasgow CNP

## 2020-10-29 ENCOUNTER — NURSING HOME VISIT (OUTPATIENT)
Dept: GERIATRICS | Facility: CLINIC | Age: 75
End: 2020-10-29
Payer: COMMERCIAL

## 2020-10-29 VITALS
OXYGEN SATURATION: 96 % | BODY MASS INDEX: 27.44 KG/M2 | DIASTOLIC BLOOD PRESSURE: 78 MMHG | HEIGHT: 67 IN | RESPIRATION RATE: 20 BRPM | WEIGHT: 174.8 LBS | HEART RATE: 74 BPM | TEMPERATURE: 97.5 F | SYSTOLIC BLOOD PRESSURE: 122 MMHG

## 2020-10-29 DIAGNOSIS — G89.18 ACUTE POST-OPERATIVE PAIN: ICD-10-CM

## 2020-10-29 DIAGNOSIS — S52.501D CLOSED FRACTURE OF DISTAL END OF RIGHT RADIUS WITH ROUTINE HEALING, UNSPECIFIED FRACTURE MORPHOLOGY, SUBSEQUENT ENCOUNTER: ICD-10-CM
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
## 2020-10-29 DIAGNOSIS — E11.9 NEW ONSET TYPE 2 DIABETES MELLITUS (H): ICD-10-CM

## 2020-10-29 DIAGNOSIS — Z98.890 STATUS POST WRIST SURGERY: ICD-10-CM

## 2020-10-29 DIAGNOSIS — R53.81 PHYSICAL DECONDITIONING: ICD-10-CM

## 2020-10-29 DIAGNOSIS — K59.03 DRUG-INDUCED CONSTIPATION: ICD-10-CM

## 2020-10-29 PROCEDURE — 99309 SBSQ NF CARE MODERATE MDM 30: CPT | Performed by: NURSE PRACTITIONER

## 2020-11-01 ASSESSMENT — MIFFLIN-ST. JEOR: SCORE: 1329.61

## 2020-11-02 VITALS
BODY MASS INDEX: 27.4 KG/M2 | OXYGEN SATURATION: 94 % | HEART RATE: 74 BPM | HEIGHT: 67 IN | DIASTOLIC BLOOD PRESSURE: 92 MMHG | RESPIRATION RATE: 18 BRPM | SYSTOLIC BLOOD PRESSURE: 143 MMHG | WEIGHT: 174.6 LBS | TEMPERATURE: 96.2 F

## 2020-11-03 ENCOUNTER — NURSING HOME VISIT (OUTPATIENT)
Dept: GERIATRICS | Facility: CLINIC | Age: 75
End: 2020-11-03
Payer: COMMERCIAL

## 2020-11-03 DIAGNOSIS — R53.81 PHYSICAL DECONDITIONING: ICD-10-CM

## 2020-11-03 DIAGNOSIS — G89.18 ACUTE POST-OPERATIVE PAIN: ICD-10-CM
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
## 2020-11-03 DIAGNOSIS — K59.03 DRUG-INDUCED CONSTIPATION: ICD-10-CM

## 2020-11-03 DIAGNOSIS — E11.9 NEW ONSET TYPE 2 DIABETES MELLITUS (H): ICD-10-CM

## 2020-11-03 DIAGNOSIS — Z98.890 STATUS POST WRIST SURGERY: ICD-10-CM

## 2020-11-03 DIAGNOSIS — S52.501D CLOSED FRACTURE OF DISTAL END OF RIGHT RADIUS WITH ROUTINE HEALING, UNSPECIFIED FRACTURE MORPHOLOGY, SUBSEQUENT ENCOUNTER: ICD-10-CM

## 2020-11-03 PROCEDURE — 99309 SBSQ NF CARE MODERATE MDM 30: CPT | Performed by: NURSE PRACTITIONER

## 2020-11-03 NOTE — PROGRESS NOTES
[place]    Chief Complaint   Patient presents with     Nursing Home Acute:: Surgical repair of right wrist fracture on 10/19/2020       [facility] Medical Record Number:  [id number]  Place of Service where encounter took place:  [facility] ([facility]) [[id number]]    HPI:    Isabella Prather is a 75 year old  (1945), who is being seen today for an episodic care visit.  HPI information obtained from: facility chart records, facility staff, patient report and [facility] chart review.Today's concern is: pt feels good, scant pain in wrist-- still C/O some constipation. Sugars have been more in the low 100's and some 90's lately.     Fall, subsequent encounter  Closed fracture of distal end of right radius with routine healing, unspecified fracture morphology, subsequent encounter  Status post wrist surgery  Acute post-operative pain  Physical deconditioning  New onset type 2 diabetes mellitus (H)  Drug-induced constipation      ALLERGIES: Patient has no known allergies.  Past Medical, Surgical, Family and Social History reviewed and updated in [facility].    Current Outpatient Medications   Medication Sig Dispense Refill     magnesium hydroxide (MOM) 2400 MG/10ML SUSP Take 5 mLs by mouth daily as needed for constipation       OTHER MEDICAL SUPPLIES Call NP IF BLOOD SUGARS consistently >200.       alcohol swab prep pads Use to swab area of injection/pricila as directed. 100 each 3     aspirin (ASA) 325 MG EC tablet Take 325 mg by mouth every 6 hours as needed for moderate pain       blood glucose (NO BRAND SPECIFIED) test strip Use to test blood sugar 4 times daily or as directed. (Patient taking differently: Use to test blood sugar 4 times daily AND PRN or as directed. CALL IF >250) 100 strip 6     ibuprofen (ADVIL/MOTRIN) 400 MG tablet Take 400 mg by mouth every 6 hours as needed for pain And 400mg scheduled with the am and pm oxycodone.       metFORMIN (GLUCOPHAGE) 500 MG  "tablet Take 500 mg by mouth 2 times daily (with meals) TO CLARIFY NO INSULIN OR METFORMIN DAY OF SURGERY. GIVE ALL MEDS as written the day before; Use sliding scale insulin after surgery on Monday. Can give Metformin with dinner on 10/19.Monday.   Resume Lantus and Metformin as written on 10/20 Tuesday.       miconazole (MICATIN) 2 % external powder Apply topically every hour as needed for other (topical candidiasis)       oxyCODONE (ROXICODONE) 5 MG tablet Take 5 mg by mouth every 4 hours as needed for severe pain       polyethylene glycol (MIRALAX) 17 g packet Take 8.5 g by mouth daily as needed for constipation MIX with full glass of fluid daily prn        pyrithione zinc (SELSUN BLUE/HEAD AND SHOULDERS) 1 % external shampoo Apply topically twice a week Also taking daily prn       senna-docusate (SENOKOT-S/PERICOLACE) 8.6-50 MG tablet Take 1 tablet by mouth 2 times daily       thin (NO BRAND SPECIFIED) lancets Use with lanceting device.        TODAY DURING EXAM/ROS:  No CP, SOB, Cough, dizziness, fevers, chills, HA, N/V, dysuria. Some constipation.  Appetite better.  Pain in right wrist as noted above    Physical Exam:  BP (!) 143/92   Pulse 74   Temp 96.2  F (35.7  C)   Resp 18   Ht 1.702 m (5' 7\")   Wt 79.2 kg (174 lb 9.6 oz)   LMP  (LMP Unknown)   SpO2 94%   BMI 27.35 kg/m     Exam:  GENERAL APPEARANCE:  Alert, in no distress, appears healthy, oriented, cooperative.  ENT:  Mouth with moist mucous membranes  RESP:  respiratory effort  of chest normal, lungs clear to ausculation. NAD   CV:  Auscultation of heart done , RRR, no murmur, rub, or gallop, edema, +2 pedal pulses  ABDOMEN:  normal bowel sounds, soft, nontender.  M/S:   INFANTE except right arm in removal splint, good CSM to fingers,  Healed incision right wrist area  SKIN:  Inspection of skin at baseline but limited as pt dressed.  NEURO:   Cranial nerves 2-12 are  grossly intact and at patient's baseline  PSYCH:  oriented X 3, normal insight, " judgement and memory, affect and mood normal    Recent Labs:   Recent Labs   Lab Test 10/23/20 10/15/20  1646 10/04/20  0704    139.0 137   POTASSIUM 3.8 4.72 3.7   CHLORIDE 105 103.8 106   CO2 30 25.3 25   ANIONGAP 5  --  6   * 183* 291*   BUN 12 16  19.3 10   CR 0.80 0.83 0.52   MENDEZ 9.5 10.1 8.4*     CBC RESULTS:   Recent Labs   Lab Test 10/23/20 10/15/20  1553 10/04/20  0704   WBC  --  9.3 8.7   RBC  --  4.38 3.86   HGB 12.3 14.7 12.5   HCT  --  44.2 35.9   MCV  --  100.8* 93   MCH  --  33.6* 32.4   MCHC  --  33.3 34.8   RDW  --   --  12.3   PLT  --  291 191        Lab Results   Component Value Date    A1C 13.1 10/04/2020     ASSESSMENT / PLAN:  (W19.XXXD) Fall, subsequent encounter  (primary encounter diagnosis)  (S52.501D) Closed fracture of distal end of right radius with routine healing, unspecified fracture morphology, subsequent encounter  (Z98.890) Status post wrist surgery: Right ORIF on 10/19/20  (G89.18) Acute post-operative pain  Comment/Plan: new brace,--see ortho note below, scant pain- keep oxy for now prn,  monitor.     (R53.81) Physical deconditioning  Comment/Plan: PT OT within ortho ROM orders, monitor. LCD later this week    (E11.9) New onset type 2 diabetes mellitus (H)  Comment/Plan: sugars have been dropping and in the low 100-90's now. The  Lantus held last two days. Will discontinue  Lantus, cont metformin bid. Monitor.    (K59.03) Drug-induced constipation  Comment/Plan: suboptimal still, will add MOM per pt request and cont with senna S and prn Miralax, monitor          Electronically signed by  Kiley Armendariz, FAY CNP

## 2020-11-04 ASSESSMENT — MIFFLIN-ST. JEOR: SCORE: 1332.33

## 2020-11-04 NOTE — PROGRESS NOTES
[facility] DISCHARGE SUMMARY    PATIENT'S NAME: Isabella Prather  YOB: 1945    PRIMARY CARE PROVIDER AND CLINIC RESPONSIBLE AFTER TRANSFER: Vishnu Javed MD     CODE STATUS: FULL Code    TRANSFERRING PROVIDERS: FAY Glasgow CNP, Dr. Mars Martinez MD      DATE OF SNF ADMISSION:      DATE OF SNF DISCHARGE (including anticipating DC):     DISCHARGE DISPOSITION: [facility] Provider    Nursing Facility: [facility] stay 10/3/20 to 10/5/20.     Condition on Discharge:  Stable.    Function:  Ambulatin ft w/ fww, Transfers: sba-cga  Cognitive Scores: SLUMS     Physical Function: [place] Balance Assessment:    Equipment: walker  DME: Walker    DISCHARGE DIAGNOSIS:      Type I or II open fracture of distal end of right radius with routine healing, unspecified fracture morphology, subsequent encounter  Pain due to fracture  Fall, sequela  Balance problems  Physical deconditioning  New onset type 2 diabetes mellitus (H)  Open wound of buttock, unspecified laterality, subsequent encounter  Drug-induced constipation  E. coli UTI  Cataract of right eye, unspecified cataract type        HOSPITAL COURSE: : pt fell down 8 stairs while packing her place up in anticipation of moving in with her sister in [place], [place] as she needs a little help, in general(no specific info was noted*).  This occurred on 10/3, she went to [facility]--they found a right distal radius Fx and minimal ulnar Fx--put her in splint and sent her home. She went to the ER as she felt she might need further eval and surgery.  Seen by ortho--treating it non surgically at this time but wish her to see Dr Pires in 1-2 weeks. She was found to have a A1C of 13.1 and new dx of DM--pout in insulin and metformin. She was found to have a open area on her bottom--seen by [facility]. She was sent to rehab as her sister  did not feels she could manage it at home with the upcoming move.    TCU/SNF COURSE: INITIALLY poor pain control and this improved with motrin and tylenol, she wanted to avoid narcotics.  She saw ortho and had right wrist Fx fixed by dr Pires 10/19/20. Since then she had two f/u and progressing well. Her blood sugars were very high, this improved with Metformin and adjustment of Lantus.  When metformin increased to bid, sugars began to correct nicely and we were able to wean Lantus off  Earlier this week and sugars in the low to mid 100's.      PAST MEDICAL HISTORY:  No past medical history on file.    DISCHARGE MEDICATIONS:  Current Outpatient Medications   Medication Sig Dispense Refill     alcohol swab prep pads Use to swab area of injection/pricila as directed. 100 each 3     aspirin (ASA) 325 MG EC tablet Take 325 mg by mouth every 6 hours as needed for moderate pain       blood glucose (NO BRAND SPECIFIED) test strip Use to test blood sugar 4 times daily or as directed. (Patient taking differently: Use to test blood sugar AC and prn) 100 strip 6     ibuprofen (ADVIL/MOTRIN) 400 MG tablet Take 400 mg by mouth 2 times daily as needed for pain        magnesium hydroxide (MOM) 2400 MG/10ML SUSP Take 5 mLs by mouth daily as needed for constipation        metFORMIN (GLUCOPHAGE) 500 MG tablet Take 500 mg by mouth 2 times daily (with meals)        OTHER MEDICAL SUPPLIES Call NP IF BLOOD SUGARS consistently >200.       polyethylene glycol (MIRALAX) 17 g packet Take 8.5 g by mouth daily as needed for constipation MIX with full glass of fluid daily prn        pyrithione zinc (SELSUN BLUE/HEAD AND SHOULDERS) 1 % external shampoo Apply topically twice a week Also taking daily prn       senna-docusate (SENOKOT-S/PERICOLACE) 8.6-50 MG tablet Take 1 tablet by mouth 2 times daily       thin (NO BRAND SPECIFIED) lancets Use with lanceting device.         MEDICATION CHANGES/RATIONALE:    see discharge orders  BP (!) 134/91    "Pulse 76   Temp 95  F (35  C)   Resp 18   Ht 1.702 m (5' 7\")   Wt 79.5 kg (175 lb 3.2 oz)   LMP  (LMP Unknown)   SpO2 97%   BMI 27.44 kg/m      TODAY DURING EXAM/ROS:  No CP, SOB, Cough, dizziness, fevers, chills, HA, N/V, dysuria or Bowel Abnormalities, still  a little constipated bu much better. Appetite is good.  No pain except 1/10 on wrist.      PHYSICAL EXAM Today:  A & O x 3, NAD. Lungs CTA, non labored. RRR, S1/S2 w/o murmur,gallop or rub.  Right fingers mild edema.  Abdomen soft, nontender, +BT'S. No focal neurological deficits. INFANTE except right wrist in splint with good CSM.   Incision is  Healing--see my note from .       SNF LABS:  Recent Labs   Lab Test 10/23/20 10/15/20  1646 10/04/20  0704    139.0 137   POTASSIUM 3.8 4.72 3.7   CHLORIDE 105 103.8 106   CO2 30 25.3 25   ANIONGAP 5  --  6   * 183* 291*   BUN 12 16  19.3 10   CR 0.80 0.83 0.52   MENDEZ 9.5 10.1 8.4*     CBC RESULTS:   Recent Labs   Lab Test 10/23/20 10/15/20  1553 10/04/20  0704   WBC  --  9.3 8.7   RBC  --  4.38 3.86   HGB 12.3 14.7 12.5   HCT  --  44.2 35.9   MCV  --  100.8* 93   MCH  --  33.6* 32.4   MCHC  --  33.3 34.8   RDW  --   --  12.3   PLT  --  291 191             DISCHARGE PLAN:  Occupational Therapy, Physical Therapy, Registered Nurse, Home Health Aide and From:  [facility] Follow-up with PCP in 7 days: 7 days.    Current [place] or other scheduled appointments:No future appointments.    MTM referral needed and placed by this provider: none    Pending labs: none     Discharge Treatments:see discharge orders       TOTAL DISCHARGE TIME:   Greater than 30 minutes    FAY Glasgow CNP    [facility]            Face to Face and Medical Necessity Statement for DME Provider visit    Demographic Information on Isabella Prather:  Gender: female  : 1945  [address]  [facility] [address]  [phone] (home) [phone] (work)    Medical Record: [id number]  Social " Security Number: [id number]  Primary Care Provider: Vishnu Javed MD  Insurance: Payor: [facility] / Plan: [employer] MEDICARE NON [employer] / Product Type: HMO /     HPI:   Isabella Prather is a 75 year old  (1945), who [facility] being [facility]; medical necessity statement for DME included. This patient requires the following:  DME Ordered and Medical Necessity Statement     Wheelchair Documentation  Size: 18 x 16  Corresponding cushion: Yes: comfort seat cushion  Standard foot rests: needs swing away root rests and 6 inch brake extensions  Elevating leg rests: Yes  Arm rests: Yes: fracture right wrist so full arm rests  Lap tray: No  Dose the patient use oxygen? No   Is the patient able to propel wheelchair? Yes If no why not?   And is there someone who can?yes  1. The patient has mobility limitations that impairs their ability to participate in one or more mobility related activities: Toileting, Feeding, Grooming and Bathing.  The wheelchair is suitable and necessary for use in the patient's home.  2. The patient's mobility limitations cannot be safely resolved by using a cane/walker:Yes    Reason why a cane or walker will not meet the patient's needs. (ie: balance, tolerance, level of assistance) balance issues at baseline and now NWB right wrist  3. The patients home has adequate access to use a manual wheelchair:Yes  4. The use of a manual wheelchair on a regular basis will improve the patients ability to participate in mobility related ADL's at home:yes  5. The patient is willing to use a manual wheelchair at home:Yes  6. The patient has adequate upper body strength and the mental capability to safely use a manual wheelchair and/or has a caregiver that is able to assist: Yes  7. Does the patient have a lower extremity injury or edema?No  Reason for Type of Wheelchair  Patient weight: 0 lbs 0 oz      Pt needing above DME with expected length of need  "of  6  month  due to medical necessity associated with following diagnosis:      S52.501D           Type I or II open fracture of distal end of right radius with routine healing, unspecified fracture morphology, subsequent encounter  Pain due to fracture  Fall, sequela  Balance problems  Physical deconditioning  New onset type 2 diabetes mellitus (H)  Open wound of buttock, unspecified laterality, subsequent encounter  Drug-induced constipation  E. coli UTI  Cataract of right eye, unspecified cataract type      PMH   has no past medical history on file.    ROS:see above under exam    EXAM  Vitals: BP (!) 134/91   Pulse 76   Temp 95  F (35  C)   Resp 18   Ht 1.702 m (5' 7\")   Wt 79.5 kg (175 lb 3.2 oz)   LMP  (LMP Unknown)   SpO2 97%   BMI 27.44 kg/m  ;BMI= Body mass index is 27.44 kg/m .    See exam above    ASSESSMENT/PLAN:  1. Type I or II open fracture of distal end of right radius with routine healing, unspecified fracture morphology, subsequent encounter: 10/3/20    2. Pain due to fracture    3. Fall, sequela    4. Balance problems    5. Physical deconditioning    6. New onset type 2 diabetes mellitus (H)    7. Open wound of buttock, unspecified laterality, subsequent encounter    8. Drug-induced constipation    9. E. coli UTI    10. Cataract of right eye, unspecified cataract type        Orders:  1. Facility staff/TC to contact DME company to get their order form for provider to fill out     ELECTRONICALLY SIGNED BY ALEXIS CERTIFIED PROVIDER:  FAY Glasgow CNP   NPI: [id number]    [facility]  [address] [place] [address], [address]  [place] [address]    Documentation of Face to Face and Certification for Home Health Services    I certify that patient: Isabella Prather is under my care and that I, or a nurse practitioner or physician's assistant working with me, had a face-to-face encounter that meets the physician face-to-face encounter requirements with this patient on: " 11/5/2020.    This encounter with the patient was in whole, or in part, for the following medical condition, which is the primary reason for home health care:      Type I or II open fracture of distal end of right radius with routine healing, unspecified fracture morphology, subsequent encounter  Pain due to fracture  Fall, sequela  Balance problems  Physical deconditioning  New onset type 2 diabetes mellitus (H)  Open wound of buttock, unspecified laterality, subsequent encounter  Drug-induced constipation  E. coli UTI  Cataract of right eye, unspecified cataract type  .    I certify that, based on my findings, the following services are medically necessary home health services: Nursing, Occupational Therapy and Physical Therapy.    My clinical findings support the need for the above services because: Nurse is needed: To assess teaching with new dx of dm--accuchecks, meds after changes in medications or other medical regimen.., Occupational Therapy Services are needed to assess and treat cognitive ability and address ADL safety due to impairment in deconditioning and weakness--new home living environment so safety eval. and Physical Therapy Services are needed to assess and treat the following functional impairments: deconditioning and see under OT and diagnoses above..    Further, I certify that my clinical findings support that this patient is homebound (i.e. absences from home require considerable and taxing effort and are for medical reasons or [facility] services or infrequently or of short duration when for other reasons) because: Requires assistance of another person or specialized equipment to access medical services because patient: Is unable to walk greater than 135 feet with fww feet without rest...    Based on the above findings. I certify that this patient is confined to the home and needs intermittent skilled nursing care, physical therapy and/or speech therapy.  The patient is under my care, and I  have initiated the establishment of the plan of care.  This patient will be followed by a physician who will periodically review the plan of care.  Physician/Provider to provide follow up care: Vishnu Javed MD    Attending hospital physician (the Medicare certified [facility] provider): FAY Glasgow CNP  Physician Signature: See electronic signature associated with these discharge orders.  Date: 11/5/2020

## 2020-11-05 ENCOUNTER — DISCHARGE SUMMARY NURSING HOME (OUTPATIENT)
Dept: GERIATRICS | Facility: CLINIC | Age: 75
End: 2020-11-05
Payer: COMMERCIAL

## 2020-11-05 VITALS
DIASTOLIC BLOOD PRESSURE: 91 MMHG | HEART RATE: 76 BPM | HEIGHT: 67 IN | SYSTOLIC BLOOD PRESSURE: 134 MMHG | BODY MASS INDEX: 27.5 KG/M2 | TEMPERATURE: 95 F | WEIGHT: 175.2 LBS | OXYGEN SATURATION: 97 % | RESPIRATION RATE: 18 BRPM

## 2020-11-05 DIAGNOSIS — S31.809D OPEN WOUND OF BUTTOCK, UNSPECIFIED LATERALITY, SUBSEQUENT ENCOUNTER: ICD-10-CM

## 2020-11-05 DIAGNOSIS — H26.9 CATARACT OF RIGHT EYE, UNSPECIFIED CATARACT TYPE: ICD-10-CM

## 2020-11-05 DIAGNOSIS — K59.03 DRUG-INDUCED CONSTIPATION: ICD-10-CM

## 2020-11-05 DIAGNOSIS — R26.89 BALANCE PROBLEMS: ICD-10-CM

## 2020-11-05 DIAGNOSIS — N39.0 E. COLI UTI: ICD-10-CM
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
## 2020-11-05 DIAGNOSIS — S52.501E TYPE I OR II OPEN FRACTURE OF DISTAL END OF RIGHT RADIUS WITH ROUTINE HEALING, UNSPECIFIED FRACTURE MORPHOLOGY, SUBSEQUENT ENCOUNTER: Primary | ICD-10-CM

## 2020-11-05 DIAGNOSIS — R53.81 PHYSICAL DECONDITIONING: ICD-10-CM

## 2020-11-05 DIAGNOSIS — E11.9 NEW ONSET TYPE 2 DIABETES MELLITUS (H): ICD-10-CM

## 2020-11-05 DIAGNOSIS — T14.8XXA PAIN DUE TO FRACTURE: ICD-10-CM

## 2020-11-05 DIAGNOSIS — B96.20 E. COLI UTI: ICD-10-CM

## 2020-11-05 PROCEDURE — 99310 SBSQ NF CARE HIGH MDM 45: CPT | Performed by: NURSE PRACTITIONER

## 2020-11-05 NOTE — PATIENT INSTRUCTIONS
[facility] Discharge Orders    Name: Isabella Prather  : 1945  Planned Discharge Date: 20  Discharged to: with family sister and family    MEDICAL FOLLOW UP  Follow up with PCP in 1-2 weeks --s[place]ilia [place]  Follow up with [facility] recs      FUTURE LABS: No labs orders/due    ORDER CHANGES:  Lantus weaned off, metformin added and increased--good sugar control.    DISCHARGE MEDICATIONS:  The patient s pharmacy is authorized to dispense a 30-day supply of medications. Refill requests should be directed to the primary provider, Vishnu Javed MD .   No narcotics are prescribed at time of discharge.   Current Outpatient Medications   Medication Sig Dispense Refill     alcohol swab prep pads Use to swab area of injection/pricila as directed. 100 each 3     aspirin (ASA) 325 MG EC tablet Take 325 mg by mouth every 6 hours as needed for moderate pain       blood glucose (NO BRAND SPECIFIED) test strip Use to test blood sugar 4 times daily or as directed. (Patient taking differently: Use to test blood sugar AC and prn) 100 strip 6     ibuprofen (ADVIL/MOTRIN) 400 MG tablet Take 400 mg by mouth 2 times daily as needed for pain        magnesium hydroxide (MOM) 2400 MG/10ML SUSP Take 5 mLs by mouth daily as needed for constipation        metFORMIN (GLUCOPHAGE) 500 MG tablet Take 500 mg by mouth 2 times daily (with meals)        OTHER MEDICAL SUPPLIES Call NP IF BLOOD SUGARS consistently >200.       polyethylene glycol (MIRALAX) 17 g packet Take 8.5 g by mouth daily as needed for constipation MIX with full glass of fluid daily prn        pyrithione zinc (SELSUN BLUE/HEAD AND SHOULDERS) 1 % external shampoo Apply topically twice a week Also taking daily prn       senna-docusate (SENOKOT-S/PERICOLACE) 8.6-50 MG tablet Take 1 tablet by mouth 2 times daily       thin (NO BRAND SPECIFIED) lancets Use with lanceting device.         SERVICES:  Home Care:   Occupational Therapy, Physical Therapy, Registered Nurse, Home Health Aide and From:  [facility]    ADDITIONAL INSTRUCTIONS:  Weight bearing restrictions:  nwb--SEE ORTHO INSTRUCTIONS FOR EXERCISES    Kiley Armendariz, FAY CNP  This document was electronically signed on November 5, 2020

## 2024-06-17 PROBLEM — Z76.89 HEALTH CARE HOME: Status: RESOLVED | Noted: 2020-10-15 | Resolved: 2024-06-17
